# Patient Record
Sex: FEMALE | Race: WHITE | NOT HISPANIC OR LATINO | Employment: OTHER | URBAN - NONMETROPOLITAN AREA
[De-identification: names, ages, dates, MRNs, and addresses within clinical notes are randomized per-mention and may not be internally consistent; named-entity substitution may affect disease eponyms.]

---

## 2023-03-10 DIAGNOSIS — M51.36 LUMBAR DEGENERATIVE DISC DISEASE: ICD-10-CM

## 2023-03-10 RX ORDER — GABAPENTIN 300 MG/1
300 CAPSULE ORAL DAILY
COMMUNITY
Start: 2016-03-14 | End: 2023-03-10 | Stop reason: SDUPTHER

## 2023-03-10 RX ORDER — GABAPENTIN 300 MG/1
300 CAPSULE ORAL DAILY
Qty: 90 CAPSULE | Refills: 0 | Status: SHIPPED | OUTPATIENT
Start: 2023-03-10 | End: 2023-05-30 | Stop reason: SDUPTHER

## 2023-05-19 DIAGNOSIS — E03.9 HYPOTHYROIDISM, UNSPECIFIED TYPE: ICD-10-CM

## 2023-05-19 RX ORDER — LEVOTHYROXINE SODIUM 50 UG/1
1 TABLET ORAL DAILY
COMMUNITY
Start: 2020-11-16 | End: 2023-05-19 | Stop reason: SDUPTHER

## 2023-05-19 RX ORDER — LEVOTHYROXINE SODIUM 50 UG/1
50 TABLET ORAL DAILY
Qty: 90 TABLET | Refills: 0 | Status: SHIPPED | OUTPATIENT
Start: 2023-05-19 | End: 2023-08-18 | Stop reason: SDUPTHER

## 2023-05-30 DIAGNOSIS — G25.81 RESTLESS LEG SYNDROME: ICD-10-CM

## 2023-05-30 DIAGNOSIS — M51.36 LUMBAR DEGENERATIVE DISC DISEASE: ICD-10-CM

## 2023-05-30 RX ORDER — GABAPENTIN 300 MG/1
300 CAPSULE ORAL DAILY
Qty: 90 CAPSULE | Refills: 0 | Status: SHIPPED | OUTPATIENT
Start: 2023-05-30 | End: 2023-08-28 | Stop reason: SDUPTHER

## 2023-05-30 RX ORDER — PRAMIPEXOLE DIHYDROCHLORIDE 1.5 MG/1
0.75 TABLET ORAL NIGHTLY
Qty: 45 TABLET | Refills: 0 | Status: SHIPPED | OUTPATIENT
Start: 2023-05-30 | End: 2023-08-28 | Stop reason: SDUPTHER

## 2023-06-06 LAB
ALANINE AMINOTRANSFERASE (SGPT) (U/L) IN SER/PLAS: 23 U/L (ref 7–45)
ALBUMIN (G/DL) IN SER/PLAS: 4.2 G/DL (ref 3.4–5)
ALKALINE PHOSPHATASE (U/L) IN SER/PLAS: 84 U/L (ref 33–136)
ANION GAP IN SER/PLAS: 14 MMOL/L (ref 10–20)
ASPARTATE AMINOTRANSFERASE (SGOT) (U/L) IN SER/PLAS: 21 U/L (ref 9–39)
BILIRUBIN TOTAL (MG/DL) IN SER/PLAS: 0.3 MG/DL (ref 0–1.2)
CALCIUM (MG/DL) IN SER/PLAS: 9.3 MG/DL (ref 8.6–10.3)
CARBON DIOXIDE, TOTAL (MMOL/L) IN SER/PLAS: 25 MMOL/L (ref 21–32)
CHLORIDE (MMOL/L) IN SER/PLAS: 106 MMOL/L (ref 98–107)
CREATININE (MG/DL) IN SER/PLAS: 1.02 MG/DL (ref 0.5–1.05)
ERYTHROCYTE DISTRIBUTION WIDTH (RATIO) BY AUTOMATED COUNT: 14.5 % (ref 11.5–14.5)
ERYTHROCYTE MEAN CORPUSCULAR HEMOGLOBIN CONCENTRATION (G/DL) BY AUTOMATED: 31.3 G/DL (ref 32–36)
ERYTHROCYTE MEAN CORPUSCULAR VOLUME (FL) BY AUTOMATED COUNT: 96 FL (ref 80–100)
ERYTHROCYTES (10*6/UL) IN BLOOD BY AUTOMATED COUNT: 4.19 X10E12/L (ref 4–5.2)
GFR FEMALE: 58 ML/MIN/1.73M2
GLUCOSE (MG/DL) IN SER/PLAS: 144 MG/DL (ref 74–99)
HEMATOCRIT (%) IN BLOOD BY AUTOMATED COUNT: 40.3 % (ref 36–46)
HEMOGLOBIN (G/DL) IN BLOOD: 12.6 G/DL (ref 12–16)
LEUKOCYTES (10*3/UL) IN BLOOD BY AUTOMATED COUNT: 7.3 X10E9/L (ref 4.4–11.3)
PLATELETS (10*3/UL) IN BLOOD AUTOMATED COUNT: 246 X10E9/L (ref 150–450)
POTASSIUM (MMOL/L) IN SER/PLAS: 3.5 MMOL/L (ref 3.5–5.3)
PROTEIN TOTAL: 7.1 G/DL (ref 6.4–8.2)
PROTEIN TOTAL: 7.1 G/DL (ref 6.4–8.2)
SEDIMENTATION RATE, ERYTHROCYTE: 23 MM/H (ref 0–30)
SODIUM (MMOL/L) IN SER/PLAS: 141 MMOL/L (ref 136–145)
THYROTROPIN (MIU/L) IN SER/PLAS BY DETECTION LIMIT <= 0.05 MIU/L: 3.87 MIU/L (ref 0.44–3.98)
UREA NITROGEN (MG/DL) IN SER/PLAS: 30 MG/DL (ref 6–23)

## 2023-06-07 LAB
ANA PATTERN: ABNORMAL
ANA TITER: ABNORMAL
ANTI-NUCLEAR ANTIBODY (ANA): POSITIVE
COBALAMIN (VITAMIN B12) (PG/ML) IN SER/PLAS: 575 PG/ML (ref 211–911)
ESTIMATED AVERAGE GLUCOSE FOR HBA1C: 123 MG/DL
FOLATE (NG/ML) IN SER/PLAS: >24 NG/ML
HEMOGLOBIN A1C/HEMOGLOBIN TOTAL IN BLOOD: 5.9 %
LEAD (UG/DL) IN BLOOD: <0.5 UG/DL (ref 0–4.9)
SYPHILIS TOTAL AB: NONREACTIVE

## 2023-06-10 LAB
ARSENIC: <10 UG/L
LEAD, BLOOD (ARUP): <2 UG/DL
MERCURY BLOOD: <2.5 UG/L

## 2023-06-13 PROBLEM — G25.81 RESTLESS LEGS SYNDROME: Status: ACTIVE | Noted: 2023-06-13

## 2023-06-13 PROBLEM — I87.2 CHRONIC VENOUS STASIS DERMATITIS: Status: ACTIVE | Noted: 2023-06-13

## 2023-06-13 PROBLEM — R91.8 GROUND GLASS OPACITY PRESENT ON IMAGING OF LUNG: Status: ACTIVE | Noted: 2023-06-13

## 2023-06-13 PROBLEM — M70.71 BURSITIS OF RIGHT HIP: Status: ACTIVE | Noted: 2023-06-13

## 2023-06-13 PROBLEM — G47.30 APNEA, SLEEP: Status: ACTIVE | Noted: 2023-06-13

## 2023-06-13 PROBLEM — M25.559 ARTHRALGIA OF PELVIC REGION AND THIGH: Status: ACTIVE | Noted: 2023-06-13

## 2023-06-13 PROBLEM — G62.9 NEUROPATHY: Status: ACTIVE | Noted: 2023-06-13

## 2023-06-13 PROBLEM — Z98.1 S/P LAMINECTOMY WITH SPINAL FUSION: Status: ACTIVE | Noted: 2023-06-13

## 2023-06-13 PROBLEM — E66.01 MORBID OBESITY (MULTI): Status: ACTIVE | Noted: 2023-06-13

## 2023-06-13 PROBLEM — E78.5 DYSLIPIDEMIA: Status: ACTIVE | Noted: 2023-06-13

## 2023-06-13 PROBLEM — R09.89 BRUIT OF LEFT CAROTID ARTERY: Status: ACTIVE | Noted: 2023-06-13

## 2023-06-13 PROBLEM — M16.9 OSTEOARTHRITIS OF HIP: Status: ACTIVE | Noted: 2023-06-13

## 2023-06-13 PROBLEM — M54.30 SCIATICA: Status: ACTIVE | Noted: 2023-06-13

## 2023-06-13 PROBLEM — J01.10 ACUTE NON-RECURRENT FRONTAL SINUSITIS: Status: ACTIVE | Noted: 2023-06-13

## 2023-06-13 PROBLEM — M51.36 LUMBAR DEGENERATIVE DISC DISEASE: Status: ACTIVE | Noted: 2023-06-13

## 2023-06-13 PROBLEM — Z96.649 S/P TOTAL HIP ARTHROPLASTY: Status: ACTIVE | Noted: 2023-06-13

## 2023-06-13 PROBLEM — I89.0 LYMPHEDEMA: Status: ACTIVE | Noted: 2023-06-13

## 2023-06-13 PROBLEM — M54.16 CHRONIC LUMBAR RADICULOPATHY: Status: ACTIVE | Noted: 2023-06-13

## 2023-06-13 PROBLEM — R74.8 ABNORMAL LIVER ENZYMES: Status: ACTIVE | Noted: 2023-06-13

## 2023-06-13 PROBLEM — E03.9 HYPOTHYROID: Status: ACTIVE | Noted: 2023-06-13

## 2023-06-13 PROBLEM — M48.07 SPINAL STENOSIS OF LUMBOSACRAL REGION: Status: ACTIVE | Noted: 2023-06-13

## 2023-06-13 PROBLEM — M51.369 LUMBAR DEGENERATIVE DISC DISEASE: Status: ACTIVE | Noted: 2023-06-13

## 2023-06-13 PROBLEM — M54.50 LOW BACK PAIN: Status: ACTIVE | Noted: 2023-06-13

## 2023-06-13 PROBLEM — M54.12 CERVICAL RADICULITIS: Status: ACTIVE | Noted: 2023-06-13

## 2023-06-13 PROBLEM — E78.00 HYPERCHOLESTEROLEMIA: Status: ACTIVE | Noted: 2023-06-13

## 2023-06-13 PROBLEM — C50.919 BREAST CANCER (MULTI): Status: ACTIVE | Noted: 2023-06-13

## 2023-06-13 PROBLEM — E78.1 HYPERTRIGLYCERIDEMIA: Status: ACTIVE | Noted: 2023-06-13

## 2023-06-13 PROBLEM — I10 BENIGN ESSENTIAL HYPERTENSION: Status: ACTIVE | Noted: 2023-06-13

## 2023-06-13 PROBLEM — E55.9 VITAMIN D DEFICIENCY: Status: ACTIVE | Noted: 2023-06-13

## 2023-06-13 PROBLEM — I87.2 VENOUS INSUFFICIENCY OF BOTH LOWER EXTREMITIES: Status: ACTIVE | Noted: 2023-06-13

## 2023-06-13 PROBLEM — I10 HYPERTENSION: Status: ACTIVE | Noted: 2023-06-13

## 2023-06-13 PROBLEM — I65.23 ATHEROSCLEROSIS OF BOTH CAROTID ARTERIES: Status: ACTIVE | Noted: 2023-06-13

## 2023-06-13 LAB
ALBUMIN ELP: 4.1 G/DL (ref 3.4–5)
ALPHA 1: 0.3 G/DL (ref 0.2–0.6)
ALPHA 2: 0.9 G/DL (ref 0.4–1.1)
BETA: 0.8 G/DL (ref 0.5–1.2)
GAMMA GLOBULIN: 1 G/DL (ref 0.5–1.4)
M-PROTEIN 1: 0.2 G/DL
PATH REVIEW - SERUM IMMUNOFIXATION: NORMAL
PATH REVIEW-SERUM PROTEIN ELECTROPHORESIS: NORMAL
PROTEIN ELECTROPHORESIS INTERPRETATION: ABNORMAL
PROTEIN TOTAL: 7.1 G/DL (ref 6.4–8.2)
SERUM IMMUNOFIXATION INTERPRETATION: ABNORMAL

## 2023-06-13 RX ORDER — ASPIRIN 81 MG/1
1 TABLET ORAL DAILY
COMMUNITY
Start: 2017-02-23 | End: 2023-07-11 | Stop reason: ALTCHOICE

## 2023-06-13 RX ORDER — LATANOPROST 50 UG/ML
SOLUTION/ DROPS OPHTHALMIC
COMMUNITY
Start: 2013-07-26

## 2023-06-13 RX ORDER — TOPIRAMATE 50 MG/1
50 TABLET, FILM COATED ORAL 2 TIMES DAILY
COMMUNITY
End: 2023-08-22 | Stop reason: SDUPTHER

## 2023-06-13 RX ORDER — BRIMONIDINE TARTRATE 1 MG/ML
SOLUTION/ DROPS OPHTHALMIC
COMMUNITY
Start: 2014-01-20 | End: 2023-07-11 | Stop reason: ALTCHOICE

## 2023-06-13 RX ORDER — BRIMONIDINE TARTRATE 2 MG/ML
SOLUTION/ DROPS OPHTHALMIC
COMMUNITY
Start: 2023-04-11

## 2023-06-13 RX ORDER — OMEGA-3 FATTY ACIDS 1000 MG
CAPSULE ORAL 2 TIMES DAILY
COMMUNITY
End: 2023-09-25 | Stop reason: SINTOL

## 2023-06-13 RX ORDER — LANOLIN ALCOHOL/MO/W.PET/CERES
1 CREAM (GRAM) TOPICAL DAILY
COMMUNITY

## 2023-06-13 RX ORDER — CARVEDILOL 25 MG/1
25 TABLET ORAL 2 TIMES DAILY
COMMUNITY
Start: 2020-05-04 | End: 2023-08-10 | Stop reason: SDUPTHER

## 2023-06-13 RX ORDER — ACETAMINOPHEN 325 MG/1
325 TABLET ORAL EVERY 6 HOURS PRN
COMMUNITY
Start: 2021-06-07

## 2023-06-13 RX ORDER — ROSUVASTATIN CALCIUM 10 MG/1
1 TABLET, COATED ORAL DAILY
COMMUNITY
Start: 2018-12-11 | End: 2023-07-11 | Stop reason: SDUPTHER

## 2023-06-13 RX ORDER — DORZOLAMIDE HYDROCHLORIDE AND TIMOLOL MALEATE 20; 5 MG/ML; MG/ML
SOLUTION/ DROPS OPHTHALMIC 2 TIMES DAILY
COMMUNITY
Start: 2013-09-16

## 2023-06-13 RX ORDER — CHOLECALCIFEROL (VITAMIN D3) 125 MCG
125 CAPSULE ORAL DAILY
COMMUNITY
Start: 2015-06-18 | End: 2023-11-14 | Stop reason: SDUPTHER

## 2023-06-13 RX ORDER — AZILSARTAN KAMEDOXOMIL AND CHLORTHALIDONE 40; 25 MG/1; MG/1
1 TABLET ORAL DAILY
COMMUNITY
Start: 2020-07-16 | End: 2024-03-28 | Stop reason: SDUPTHER

## 2023-07-11 ENCOUNTER — OFFICE VISIT (OUTPATIENT)
Dept: PRIMARY CARE | Facility: CLINIC | Age: 73
End: 2023-07-11
Payer: MEDICARE

## 2023-07-11 ENCOUNTER — LAB (OUTPATIENT)
Dept: LAB | Facility: LAB | Age: 73
End: 2023-07-11
Payer: MEDICARE

## 2023-07-11 VITALS
DIASTOLIC BLOOD PRESSURE: 60 MMHG | BODY MASS INDEX: 39.22 KG/M2 | OXYGEN SATURATION: 91 % | SYSTOLIC BLOOD PRESSURE: 136 MMHG | HEART RATE: 70 BPM | TEMPERATURE: 97.9 F | WEIGHT: 221.4 LBS

## 2023-07-11 DIAGNOSIS — L97.511 NON-PRESSURE CHRONIC ULCER OF OTHER PART OF RIGHT FOOT LIMITED TO BREAKDOWN OF SKIN (MULTI): Primary | ICD-10-CM

## 2023-07-11 DIAGNOSIS — R05.9 COUGH, UNSPECIFIED TYPE: ICD-10-CM

## 2023-07-11 DIAGNOSIS — E78.00 HYPERCHOLESTEROLEMIA: ICD-10-CM

## 2023-07-11 DIAGNOSIS — R77.8 ABNORMAL SERUM PROTEIN ELECTROPHORESIS: ICD-10-CM

## 2023-07-11 DIAGNOSIS — E66.01 MORBID OBESITY (MULTI): ICD-10-CM

## 2023-07-11 DIAGNOSIS — E78.5 DYSLIPIDEMIA: ICD-10-CM

## 2023-07-11 DIAGNOSIS — G47.30 SLEEP APNEA, UNSPECIFIED TYPE: ICD-10-CM

## 2023-07-11 DIAGNOSIS — I10 BENIGN ESSENTIAL HYPERTENSION: ICD-10-CM

## 2023-07-11 DIAGNOSIS — G56.03 BILATERAL CARPAL TUNNEL SYNDROME: ICD-10-CM

## 2023-07-11 DIAGNOSIS — C50.912 MALIGNANT NEOPLASM OF LEFT FEMALE BREAST, UNSPECIFIED ESTROGEN RECEPTOR STATUS, UNSPECIFIED SITE OF BREAST (MULTI): ICD-10-CM

## 2023-07-11 DIAGNOSIS — R73.03 PREDIABETES: ICD-10-CM

## 2023-07-11 PROCEDURE — 84165 PROTEIN E-PHORESIS SERUM: CPT | Performed by: PATHOLOGY

## 2023-07-11 PROCEDURE — 86334 IMMUNOFIX E-PHORESIS SERUM: CPT

## 2023-07-11 PROCEDURE — 1157F ADVNC CARE PLAN IN RCRD: CPT | Performed by: INTERNAL MEDICINE

## 2023-07-11 PROCEDURE — 1160F RVW MEDS BY RX/DR IN RCRD: CPT | Performed by: INTERNAL MEDICINE

## 2023-07-11 PROCEDURE — 3075F SYST BP GE 130 - 139MM HG: CPT | Performed by: INTERNAL MEDICINE

## 2023-07-11 PROCEDURE — 36415 COLL VENOUS BLD VENIPUNCTURE: CPT

## 2023-07-11 PROCEDURE — 84165 PROTEIN E-PHORESIS SERUM: CPT

## 2023-07-11 PROCEDURE — 3078F DIAST BP <80 MM HG: CPT | Performed by: INTERNAL MEDICINE

## 2023-07-11 PROCEDURE — 1036F TOBACCO NON-USER: CPT | Performed by: INTERNAL MEDICINE

## 2023-07-11 PROCEDURE — 1159F MED LIST DOCD IN RCRD: CPT | Performed by: INTERNAL MEDICINE

## 2023-07-11 PROCEDURE — 99215 OFFICE O/P EST HI 40 MIN: CPT | Performed by: INTERNAL MEDICINE

## 2023-07-11 PROCEDURE — 86320 SERUM IMMUNOELECTROPHORESIS: CPT | Performed by: PATHOLOGY

## 2023-07-11 RX ORDER — METFORMIN HYDROCHLORIDE 500 MG/1
500 TABLET ORAL
Qty: 60 TABLET | Refills: 11 | Status: SHIPPED | OUTPATIENT
Start: 2023-07-11 | End: 2023-09-25 | Stop reason: SINTOL

## 2023-07-11 RX ORDER — ROSUVASTATIN CALCIUM 10 MG/1
10 TABLET, COATED ORAL DAILY
Qty: 90 TABLET | Refills: 0 | Status: SHIPPED | OUTPATIENT
Start: 2023-07-11 | End: 2023-09-28

## 2023-07-11 ASSESSMENT — ENCOUNTER SYMPTOMS
PALPITATIONS: 0
FEVER: 0
TREMORS: 1
COUGH: 1
ARTHRALGIAS: 0
DIARRHEA: 0
SORE THROAT: 0
SINUS PAIN: 0
FATIGUE: 1
ABDOMINAL PAIN: 0
WHEEZING: 0
DIZZINESS: 0
UNEXPECTED WEIGHT CHANGE: 1
BRUISES/BLEEDS EASILY: 0
BLOOD IN STOOL: 0
HEADACHES: 0
DIFFICULTY URINATING: 0
WEAKNESS: 1
HYPERTENSION: 1

## 2023-07-11 NOTE — PROGRESS NOTES
"Subjective   Patient ID: Ashli Justice is a 72 y.o. female who presents for Hyperlipidemia, Hypertension, Hypothyroidism, Cough (Tickle in throat and cough since starting Topiramate), Peripheral Neuropathy (Seems to be getting worse, but does not want to see anyone out of town for it), Immunizations (Discuss pneumonia ), and Med Refill.    - Patient seen by podiatry work-up showed-prediabetes hemoglobin A1c 5.9 counseled about diet controlled low-carb diet start metformin 500 mg twice a day patient agreed follow-up results closely in 4 weeks  - Abnormal blood work high immunoglobulin on the serum protein electrophoresis very trivial elevation need to repeat this test again for further recommendation order placed today  -Bilateral hand carpal tunnel syndrome and weakness occasionally dropping objects from her hand  Patient counseled about the need to follow-up hand surgery patient declined aware of risk and benefit  - Underlying sleep apnea continue with fatigue and tiredness and weight gain declined CPAP declined evaluation by sleep clinic  Morbid obesity  - Coughing it is unclear obtain chest x-ray follow-up results closely  -\" Obesity patient now BMI 39 lost about 10 pounds since last time continue topiramate 50 mg twice a day continue on 1000-calorie reduction  -Neuropathy continues gabapentin 300 mg in p.m.  -Hypertension, improving, counseled about weight loss  -Hypothyroid controlled  -Lymphedema continue with continuous wrapping per lymphedema clinic  -Lumbosacral disease cut down gabapentin take it only at bedtime  -Carotid atherosclerosis ultrasound not changed less than 50% no TIA continue on aspirin marie  -Breast cancer in remission awaiting follow-up new hematology oncology for monitoring  Follow-up 4 weeks      Hyperlipidemia  Pertinent negatives include no chest pain.   Hypertension  Pertinent negatives include no chest pain, headaches or palpitations.   Cough  Pertinent negatives include no chest pain, " ear pain, fever, headaches, rash, sore throat or wheezing.   Neuropathy      Med Refill  Associated symptoms include coughing, fatigue and weakness. Pertinent negatives include no abdominal pain, arthralgias, chest pain, congestion, fever, headaches, rash or sore throat.          Review of Systems   Constitutional:  Positive for fatigue and unexpected weight change. Negative for fever.   HENT:  Negative for congestion, ear discharge, ear pain, mouth sores, sinus pain and sore throat.    Eyes:  Negative for visual disturbance.   Respiratory:  Positive for cough. Negative for wheezing.    Cardiovascular:  Negative for chest pain, palpitations and leg swelling.   Gastrointestinal:  Negative for abdominal pain, blood in stool and diarrhea.   Genitourinary:  Negative for difficulty urinating.   Musculoskeletal:  Negative for arthralgias.   Skin:  Negative for rash.   Neurological:  Positive for tremors and weakness. Negative for dizziness and headaches.   Hematological:  Does not bruise/bleed easily.   Psychiatric/Behavioral:  Negative for behavioral problems.    All other systems reviewed and are negative.      Objective   Lab Results   Component Value Date    HGBA1C 5.9 (A) 06/06/2023      /60   Pulse 70   Temp 36.6 °C (97.9 °F)   Wt 100 kg (221 lb 6.4 oz)   SpO2 91%   BMI 39.22 kg/m²     Physical Exam  Vitals and nursing note reviewed.   Constitutional:       Appearance: Normal appearance. She is obese.   HENT:      Head: Normocephalic.      Nose: Nose normal.   Eyes:      Conjunctiva/sclera: Conjunctivae normal.      Pupils: Pupils are equal, round, and reactive to light.   Cardiovascular:      Rate and Rhythm: Regular rhythm.   Pulmonary:      Effort: Pulmonary effort is normal.      Breath sounds: Normal breath sounds.   Abdominal:      General: Abdomen is flat.      Palpations: Abdomen is soft.   Musculoskeletal:      Cervical back: Neck supple.      Right lower leg: Edema present.      Left lower leg:  Edema present.   Skin:     General: Skin is warm.   Neurological:      General: No focal deficit present.      Mental Status: She is oriented to person, place, and time.      Sensory: Sensory deficit (Peripheral neuropathy) present.      Motor: Weakness (Bilateral weak handgrip and bilateral neuropathy) present.   Psychiatric:         Mood and Affect: Mood normal.         Assessment/Plan   Ashli was seen today for hyperlipidemia, hypertension, hypothyroidism, cough, peripheral neuropathy, immunizations and med refill.  Diagnoses and all orders for this visit:  Non-pressure chronic ulcer of other part of right foot limited to breakdown of skin (CMS/HCC) (Primary)  Hypercholesterolemia  -     rosuvastatin (Crestor) 10 mg tablet; Take 1 tablet (10 mg) by mouth once daily.  Morbid obesity (CMS/HCC)  Benign essential hypertension  Bilateral carpal tunnel syndrome  Sleep apnea, unspecified type  Dyslipidemia  Prediabetes  -     metFORMIN (Glucophage) 500 mg tablet; Take 1 tablet (500 mg) by mouth 2 times a day with meals.  Abnormal serum protein electrophoresis  -     Serum Protein Electrophoresis + Immunofixation; Future  Cough, unspecified type  -     XR chest 2 views; Future  Malignant neoplasm of left female breast, unspecified estrogen receptor status, unspecified site of breast (CMS/HCC)  Other orders  -     Follow Up In Primary Care - Established; Future   - Patient seen by podiatry work-up showed-prediabetes hemoglobin A1c 5.9 counseled about diet controlled low-carb diet start metformin 500 mg twice a day patient agreed follow-up results closely in 4 weeks  - Abnormal blood work high immunoglobulin on the serum protein electrophoresis very trivial elevation need to repeat this test again for further recommendation order placed today  -Bilateral hand carpal tunnel syndrome and weakness occasionally dropping objects from her hand  Patient counseled about the need to follow-up hand surgery patient declined aware of  "risk and benefit  - Underlying sleep apnea continue with fatigue and tiredness and weight gain declined CPAP declined evaluation by sleep clinic  Morbid obesity  - Coughing it is unclear obtain chest x-ray follow-up results closely  -\" Obesity patient now BMI 39 lost about 10 pounds since last time continue topiramate 50 mg twice a day continue on 1000-calorie reduction  -Neuropathy continues gabapentin 300 mg in p.m.  -Hypertension, improving, counseled about weight loss  -Hypothyroid controlled  -Lymphedema continue with continuous wrapping per lymphedema clinic  -Lumbosacral disease cut down gabapentin take it only at bedtime  -Carotid atherosclerosis ultrasound not changed less than 50% no TIA continue on aspirin marie  -Breast cancer in remission awaiting follow-up new hematology oncology for monitoring  Follow-up 4 weeks  - Patient seen by podiatry work-up showed-prediabetes hemoglobin A1c 5.9 counseled about diet controlled low-carb diet start metformin 500 mg twice a day patient agreed follow-up results closely in 4 weeks  - Abnormal blood work high immunoglobulin on the serum protein electrophoresis very trivial elevation need to repeat this test again for further recommendation order placed today  -Bilateral hand carpal tunnel syndrome and weakness occasionally dropping objects from her hand  Patient counseled about the need to follow-up hand surgery patient declined aware of risk and benefit  - Underlying sleep apnea continue with fatigue and tiredness and weight gain declined CPAP declined evaluation by sleep clinic  Morbid obesity  - Coughing it is unclear obtain chest x-ray follow-up results closely  -\" Obesity patient now BMI 39 lost about 10 pounds since last time continue topiramate 50 mg twice a day continue on 1000-calorie reduction  -Neuropathy continues gabapentin 300 mg in p.m.  -Hypertension, improving, counseled about weight loss  -Hypothyroid controlled  -Lymphedema continue with continuous " wrapping per lymphedema clinic  -Lumbosacral disease cut down gabapentin take it only at bedtime  -Carotid atherosclerosis ultrasound not changed less than 50% no TIA continue on aspirin marie  -Breast cancer in remission awaiting follow-up new hematology oncology for monitoring  Follow-up 4 weeks

## 2023-07-14 LAB
ALBUMIN ELP: 4.2 G/DL (ref 3.4–5)
ALPHA 1: 0.3 G/DL (ref 0.2–0.6)
ALPHA 2: 0.9 G/DL (ref 0.4–1.1)
BETA: 0.9 G/DL (ref 0.5–1.2)
GAMMA GLOBULIN: 1 G/DL (ref 0.5–1.4)
M-PROTEIN 1: 0.2 G/DL
PATH REVIEW - SERUM IMMUNOFIXATION: NORMAL
PATH REVIEW-SERUM PROTEIN ELECTROPHORESIS: NORMAL
PROTEIN ELECTROPHORESIS INTERPRETATION: ABNORMAL
PROTEIN TOTAL: 7.3 G/DL (ref 6.4–8.2)
SERUM IMMUNOFIXATION INTERPRETATION: ABNORMAL

## 2023-07-19 DIAGNOSIS — C90.00 MULTIPLE MYELOMA, REMISSION STATUS UNSPECIFIED (MULTI): Primary | ICD-10-CM

## 2023-07-20 ENCOUNTER — TELEPHONE (OUTPATIENT)
Dept: PRIMARY CARE | Facility: CLINIC | Age: 73
End: 2023-07-20
Payer: MEDICARE

## 2023-07-20 NOTE — TELEPHONE ENCOUNTER
----- Message from Sagrario Rachel MD sent at 7/19/2023 10:26 PM EDT -----  Abnormal serum protein electrophoresis  Needs referral to hematology oncology  Patient will be referred to Dr. Kerr order placed in the EMR

## 2023-07-24 DIAGNOSIS — C90.00 MULTIPLE MYELOMA, REMISSION STATUS UNSPECIFIED (MULTI): ICD-10-CM

## 2023-08-01 DIAGNOSIS — R77.8 ABNORMAL SERUM PROTEIN ELECTROPHORESIS: ICD-10-CM

## 2023-08-10 ENCOUNTER — OFFICE VISIT (OUTPATIENT)
Dept: PRIMARY CARE | Facility: CLINIC | Age: 73
End: 2023-08-10
Payer: MEDICARE

## 2023-08-10 VITALS
WEIGHT: 221.4 LBS | SYSTOLIC BLOOD PRESSURE: 142 MMHG | BODY MASS INDEX: 39.22 KG/M2 | TEMPERATURE: 98.7 F | HEART RATE: 72 BPM | OXYGEN SATURATION: 94 % | DIASTOLIC BLOOD PRESSURE: 62 MMHG

## 2023-08-10 DIAGNOSIS — I10 BENIGN ESSENTIAL HYPERTENSION: ICD-10-CM

## 2023-08-10 DIAGNOSIS — G56.03 BILATERAL CARPAL TUNNEL SYNDROME: ICD-10-CM

## 2023-08-10 DIAGNOSIS — E78.5 DYSLIPIDEMIA: Primary | ICD-10-CM

## 2023-08-10 DIAGNOSIS — R77.8 ABNORMAL SERUM PROTEIN ELECTROPHORESIS: ICD-10-CM

## 2023-08-10 DIAGNOSIS — E78.00 HYPERCHOLESTEROLEMIA: ICD-10-CM

## 2023-08-10 PROCEDURE — 99214 OFFICE O/P EST MOD 30 MIN: CPT | Performed by: INTERNAL MEDICINE

## 2023-08-10 PROCEDURE — 1036F TOBACCO NON-USER: CPT | Performed by: INTERNAL MEDICINE

## 2023-08-10 PROCEDURE — 1160F RVW MEDS BY RX/DR IN RCRD: CPT | Performed by: INTERNAL MEDICINE

## 2023-08-10 PROCEDURE — 3077F SYST BP >= 140 MM HG: CPT | Performed by: INTERNAL MEDICINE

## 2023-08-10 PROCEDURE — 3078F DIAST BP <80 MM HG: CPT | Performed by: INTERNAL MEDICINE

## 2023-08-10 PROCEDURE — 1157F ADVNC CARE PLAN IN RCRD: CPT | Performed by: INTERNAL MEDICINE

## 2023-08-10 PROCEDURE — 1159F MED LIST DOCD IN RCRD: CPT | Performed by: INTERNAL MEDICINE

## 2023-08-10 RX ORDER — CARVEDILOL 25 MG/1
25 TABLET ORAL DAILY
Qty: 90 TABLET | Refills: 1 | Status: SHIPPED | OUTPATIENT
Start: 2023-08-10 | End: 2023-10-25

## 2023-08-10 ASSESSMENT — ENCOUNTER SYMPTOMS
PALPITATIONS: 0
COUGH: 0
SINUS PAIN: 0
DIFFICULTY URINATING: 0
UNEXPECTED WEIGHT CHANGE: 0
BLOOD IN STOOL: 0
BRUISES/BLEEDS EASILY: 0
FEVER: 0
DIZZINESS: 0
DIARRHEA: 0
SORE THROAT: 0
FATIGUE: 0
HEADACHES: 0
ARTHRALGIAS: 0
WHEEZING: 0
ABDOMINAL PAIN: 0

## 2023-08-10 ASSESSMENT — PATIENT HEALTH QUESTIONNAIRE - PHQ9
2. FEELING DOWN, DEPRESSED OR HOPELESS: NOT AT ALL
SUM OF ALL RESPONSES TO PHQ9 QUESTIONS 1 AND 2: 0
1. LITTLE INTEREST OR PLEASURE IN DOING THINGS: NOT AT ALL

## 2023-08-10 NOTE — PROGRESS NOTES
"Subjective   Patient ID: Ashli Justice is a 72 y.o. female who presents for Follow-up (1 mth follow up) and Results (Would like to know test resutls).    --Abnormal serum protein electrophoresis patient may have underlying monoclonal gammopathy patient scheduled appointment with Dr. Caruso with rheumatology also awaiting her follow-up with hematology oncology Dr. Parada  On the 24th of this months follow-up results closely  - Bilateral carpal tunnel syndrome treated conservatively declined surgical intervention at this time  - Underlying sleep apnea continue with fatigue and tiredness and weight gain declined CPAP declined evaluation by sleep clinic  Morbid obesity  - Coughing it is unclear obtain chest x-ray follow-up results closely  -\" Obesity patient now BMI 39 lost about 10 pounds since last time continue topiramate 50 mg twice a day continue on 1000-calorie reduction  -Neuropathy continues gabapentin 300 mg in p.m.  -Hypertension, improving, counseled about weight loss  -Hypothyroid controlled  -Lymphedema continue with continuous wrapping per lymphedema clinic  -Lumbosacral disease cut down gabapentin take it only at bedtime  -Carotid atherosclerosis ultrasound not changed less than 50% no TIA continue on aspirin marie  -Breast cancer in remission awaiting follow-up hematology oncology for monitoring  Follow-up 3 months             Review of Systems   Constitutional:  Negative for fatigue, fever and unexpected weight change.   HENT:  Negative for congestion, ear discharge, ear pain, mouth sores, sinus pain and sore throat.    Eyes:  Negative for visual disturbance.   Respiratory:  Negative for cough and wheezing.    Cardiovascular:  Negative for chest pain, palpitations and leg swelling.   Gastrointestinal:  Negative for abdominal pain, blood in stool and diarrhea.   Genitourinary:  Negative for difficulty urinating.   Musculoskeletal:  Negative for arthralgias.   Skin:  Negative for rash.   Neurological:  " Negative for dizziness and headaches.   Hematological:  Does not bruise/bleed easily.   Psychiatric/Behavioral:  Negative for behavioral problems.    All other systems reviewed and are negative.      Objective   Lab Results   Component Value Date    HGBA1C 5.9 (A) 06/06/2023      /62   Pulse 72   Temp 37.1 °C (98.7 °F)   Wt 100 kg (221 lb 6.4 oz)   SpO2 94%   BMI 39.22 kg/m²     Physical Exam  Vitals and nursing note reviewed.   Constitutional:       Appearance: Normal appearance.   HENT:      Head: Normocephalic.      Nose: Nose normal.   Eyes:      Conjunctiva/sclera: Conjunctivae normal.      Pupils: Pupils are equal, round, and reactive to light.   Cardiovascular:      Rate and Rhythm: Regular rhythm.   Pulmonary:      Effort: Pulmonary effort is normal.      Breath sounds: Normal breath sounds.   Abdominal:      General: Abdomen is flat.      Palpations: Abdomen is soft.   Musculoskeletal:      Cervical back: Neck supple.   Skin:     General: Skin is warm.   Neurological:      General: No focal deficit present.      Mental Status: She is oriented to person, place, and time.   Psychiatric:         Mood and Affect: Mood normal.         Assessment/Plan   Ashli was seen today for follow-up and results.  Diagnoses and all orders for this visit:  Dyslipidemia (Primary)  Benign essential hypertension  -     carvedilol (Coreg) 25 mg tablet; Take 1 tablet (25 mg) by mouth once daily.  Abnormal serum protein electrophoresis  Bilateral carpal tunnel syndrome  Hypercholesterolemia  Other orders  -     Follow Up In Primary Care - Established   --Abnormal serum protein electrophoresis patient may have underlying monoclonal gammopathy patient scheduled appointment with Dr. Caruso with rheumatology also awaiting her follow-up with hematology oncology Dr. Parada  On the 24th of this months follow-up results closely  - Bilateral carpal tunnel syndrome treated conservatively declined surgical intervention at this  "time  - Underlying sleep apnea continue with fatigue and tiredness and weight gain declined CPAP declined evaluation by sleep clinic  Morbid obesity  - Coughing it is unclear obtain chest x-ray follow-up results closely  -\" Obesity patient now BMI 39 lost about 10 pounds since last time continue topiramate 50 mg twice a day continue on 1000-calorie reduction  -Neuropathy continues gabapentin 300 mg in p.m.  -Hypertension, improving, counseled about weight loss  -Hypothyroid controlled  -Lymphedema continue with continuous wrapping per lymphedema clinic  -Lumbosacral disease cut down gabapentin take it only at bedtime  -Carotid atherosclerosis ultrasound not changed less than 50% no TIA continue on aspirin marie  -Breast cancer in remission awaiting follow-up hematology oncology for monitoring  Follow-up 3 months    "

## 2023-08-17 DIAGNOSIS — E03.9 HYPOTHYROIDISM, UNSPECIFIED TYPE: ICD-10-CM

## 2023-08-18 RX ORDER — LEVOTHYROXINE SODIUM 50 UG/1
50 TABLET ORAL DAILY
Qty: 90 TABLET | Refills: 0 | Status: SHIPPED | OUTPATIENT
Start: 2023-08-18 | End: 2023-11-10 | Stop reason: SDUPTHER

## 2023-08-22 DIAGNOSIS — G62.9 NEUROPATHY: ICD-10-CM

## 2023-08-22 LAB
ALANINE AMINOTRANSFERASE (SGPT) (U/L) IN SER/PLAS: 24 U/L (ref 7–45)
ALBUMIN (G/DL) IN SER/PLAS: 4.1 G/DL (ref 3.4–5)
ALKALINE PHOSPHATASE (U/L) IN SER/PLAS: 69 U/L (ref 33–136)
ANION GAP IN SER/PLAS: 10 MMOL/L (ref 10–20)
ASPARTATE AMINOTRANSFERASE (SGOT) (U/L) IN SER/PLAS: 23 U/L (ref 9–39)
BASOPHILS (10*3/UL) IN BLOOD BY AUTOMATED COUNT: 0.06 X10E9/L (ref 0–0.1)
BASOPHILS/100 LEUKOCYTES IN BLOOD BY AUTOMATED COUNT: 0.7 % (ref 0–2)
BILIRUBIN TOTAL (MG/DL) IN SER/PLAS: 0.3 MG/DL (ref 0–1.2)
CALCIUM (MG/DL) IN SER/PLAS: 9 MG/DL (ref 8.6–10.3)
CARBON DIOXIDE, TOTAL (MMOL/L) IN SER/PLAS: 25 MMOL/L (ref 21–32)
CHLORIDE (MMOL/L) IN SER/PLAS: 108 MMOL/L (ref 98–107)
CREATININE (MG/DL) IN SER/PLAS: 1.07 MG/DL (ref 0.5–1.05)
EOSINOPHILS (10*3/UL) IN BLOOD BY AUTOMATED COUNT: 0.09 X10E9/L (ref 0–0.4)
EOSINOPHILS/100 LEUKOCYTES IN BLOOD BY AUTOMATED COUNT: 1.1 % (ref 0–6)
ERYTHROCYTE DISTRIBUTION WIDTH (RATIO) BY AUTOMATED COUNT: 14.4 % (ref 11.5–14.5)
ERYTHROCYTE MEAN CORPUSCULAR HEMOGLOBIN CONCENTRATION (G/DL) BY AUTOMATED: 31.9 G/DL (ref 32–36)
ERYTHROCYTE MEAN CORPUSCULAR VOLUME (FL) BY AUTOMATED COUNT: 96 FL (ref 80–100)
ERYTHROCYTES (10*6/UL) IN BLOOD BY AUTOMATED COUNT: 4.03 X10E12/L (ref 4–5.2)
GFR FEMALE: 54 ML/MIN/1.73M2
GLUCOSE (MG/DL) IN SER/PLAS: 90 MG/DL (ref 74–99)
HEMATOCRIT (%) IN BLOOD BY AUTOMATED COUNT: 38.6 % (ref 36–46)
HEMOGLOBIN (G/DL) IN BLOOD: 12.3 G/DL (ref 12–16)
IMMATURE GRANULOCYTES/100 LEUKOCYTES IN BLOOD BY AUTOMATED COUNT: 0.4 % (ref 0–0.9)
LACTATE DEHYDROGENASE (U/L) IN SER/PLAS BY LAC->PYR RXN: 165 U/L (ref 84–246)
LEUKOCYTES (10*3/UL) IN BLOOD BY AUTOMATED COUNT: 8.2 X10E9/L (ref 4.4–11.3)
LYMPHOCYTES (10*3/UL) IN BLOOD BY AUTOMATED COUNT: 2.04 X10E9/L (ref 0.8–3)
LYMPHOCYTES/100 LEUKOCYTES IN BLOOD BY AUTOMATED COUNT: 24.8 % (ref 13–44)
MONOCYTES (10*3/UL) IN BLOOD BY AUTOMATED COUNT: 0.65 X10E9/L (ref 0.05–0.8)
MONOCYTES/100 LEUKOCYTES IN BLOOD BY AUTOMATED COUNT: 7.9 % (ref 2–10)
NEUTROPHILS (10*3/UL) IN BLOOD BY AUTOMATED COUNT: 5.37 X10E9/L (ref 1.6–5.5)
NEUTROPHILS/100 LEUKOCYTES IN BLOOD BY AUTOMATED COUNT: 65.1 % (ref 40–80)
PLATELETS (10*3/UL) IN BLOOD AUTOMATED COUNT: 221 X10E9/L (ref 150–450)
POTASSIUM (MMOL/L) IN SER/PLAS: 4.3 MMOL/L (ref 3.5–5.3)
PROTEIN TOTAL: 7.1 G/DL (ref 6.4–8.2)
SODIUM (MMOL/L) IN SER/PLAS: 139 MMOL/L (ref 136–145)
UREA NITROGEN (MG/DL) IN SER/PLAS: 29 MG/DL (ref 6–23)

## 2023-08-22 RX ORDER — TOPIRAMATE 50 MG/1
50 TABLET, FILM COATED ORAL 2 TIMES DAILY
Qty: 180 TABLET | Refills: 1 | Status: SHIPPED | OUTPATIENT
Start: 2023-08-22 | End: 2023-11-14 | Stop reason: SDUPTHER

## 2023-08-23 LAB
HEPATITIS A VIRUS IGM AB PRESENCE IN SER/PLAS BY IMMUNOASSAY: NONREACTIVE
HEPATITIS B VIRUS CORE IGM AB PRESENCE IN SER/PLAS BY IMMUNOASSY: NONREACTIVE
HEPATITIS B VIRUS SURFACE AG PRESENCE IN SERUM: NONREACTIVE
HEPATITIS C VIRUS AB PRESENCE IN SERUM: NONREACTIVE
IGA (MG/DL) IN SER/PLAS: 201 MG/DL (ref 70–400)
IGG (MG/DL) IN SER/PLAS: 1060 MG/DL (ref 700–1600)
IGM (MG/DL) IN SER/PLAS: 78 MG/DL (ref 40–230)
IMMUNOGLOBULIN LIGHT CHAINS KAPPA/LAMBDA (MASS RATIO) IN SERUM: 1.58 (ref 0.26–1.65)
IMMUNOGLOBULIN LIGHT CHAINS.KAPPA (MG/DL) IN SERUM: 3.88 MG/DL (ref 0.33–1.94)
IMMUNOGLOBULIN LIGHT CHAINS.LAMBDA (MG/DL) IN SERUM: 2.46 MG/DL (ref 0.57–2.63)

## 2023-08-27 LAB
ALBUMIN ELP: 4.1 G/DL (ref 3.4–5)
ALPHA 1: 0.3 G/DL (ref 0.2–0.6)
ALPHA 2: 0.9 G/DL (ref 0.4–1.1)
BETA: 0.8 G/DL (ref 0.5–1.2)
GAMMA GLOBULIN: 0.9 G/DL (ref 0.5–1.4)
M-PROTEIN 1: 0.1 G/DL
PATH REVIEW - SERUM IMMUNOFIXATION: NORMAL
PATH REVIEW-SERUM PROTEIN ELECTROPHORESIS: NORMAL
PROTEIN ELECTROPHORESIS INTERPRETATION: ABNORMAL
PROTEIN TOTAL: 7.1 G/DL (ref 6.4–8.2)
SERUM IMMUNOFIXATION INTERPRETATION: ABNORMAL

## 2023-08-28 DIAGNOSIS — M51.36 LUMBAR DEGENERATIVE DISC DISEASE: ICD-10-CM

## 2023-08-28 DIAGNOSIS — G25.81 RESTLESS LEG SYNDROME: ICD-10-CM

## 2023-08-28 RX ORDER — PRAMIPEXOLE DIHYDROCHLORIDE 1.5 MG/1
0.75 TABLET ORAL NIGHTLY
Qty: 45 TABLET | Refills: 0 | Status: SHIPPED | OUTPATIENT
Start: 2023-08-28 | End: 2023-11-30 | Stop reason: SDUPTHER

## 2023-08-28 RX ORDER — GABAPENTIN 300 MG/1
300 CAPSULE ORAL DAILY
Qty: 90 CAPSULE | Refills: 0 | Status: SHIPPED | OUTPATIENT
Start: 2023-08-28 | End: 2023-11-14 | Stop reason: SDUPTHER

## 2023-09-24 PROBLEM — M54.50 BACK PAIN, LUMBOSACRAL: Status: ACTIVE | Noted: 2023-09-24

## 2023-09-24 PROBLEM — M25.551 RIGHT HIP PAIN: Status: ACTIVE | Noted: 2023-09-24

## 2023-09-24 PROBLEM — M54.50 BACK PAIN, LUMBOSACRAL: Status: RESOLVED | Noted: 2023-09-24 | Resolved: 2023-09-24

## 2023-09-24 PROBLEM — M19.90 ARTHRITIS: Status: ACTIVE | Noted: 2023-09-24

## 2023-09-24 PROBLEM — E66.9 CLASS 2 OBESITY WITH BODY MASS INDEX (BMI) OF 39.0 TO 39.9 IN ADULT: Status: ACTIVE | Noted: 2023-09-24

## 2023-09-24 PROBLEM — E66.812 CLASS 2 OBESITY WITH BODY MASS INDEX (BMI) OF 39.0 TO 39.9 IN ADULT: Status: ACTIVE | Noted: 2023-09-24

## 2023-09-24 RX ORDER — SODIUM, POTASSIUM,MAG SULFATES 17.5-3.13G
SOLUTION, RECONSTITUTED, ORAL ORAL
COMMUNITY
End: 2023-09-25 | Stop reason: ALTCHOICE

## 2023-09-24 RX ORDER — BRIMONIDINE TARTRATE 1 MG/ML
SOLUTION/ DROPS OPHTHALMIC
COMMUNITY
End: 2023-09-25 | Stop reason: ALTCHOICE

## 2023-09-24 RX ORDER — ASPIRIN 81 MG/1
81 TABLET ORAL DAILY
COMMUNITY
End: 2023-09-25 | Stop reason: ALTCHOICE

## 2023-09-25 ENCOUNTER — OFFICE VISIT (OUTPATIENT)
Dept: PRIMARY CARE | Facility: CLINIC | Age: 73
End: 2023-09-25
Payer: MEDICARE

## 2023-09-25 VITALS
OXYGEN SATURATION: 95 % | DIASTOLIC BLOOD PRESSURE: 62 MMHG | TEMPERATURE: 97.1 F | WEIGHT: 221.2 LBS | BODY MASS INDEX: 39.19 KG/M2 | HEART RATE: 59 BPM | SYSTOLIC BLOOD PRESSURE: 120 MMHG

## 2023-09-25 DIAGNOSIS — D47.2 MGUS (MONOCLONAL GAMMOPATHY OF UNKNOWN SIGNIFICANCE): ICD-10-CM

## 2023-09-25 DIAGNOSIS — E13.9 DIABETES 1.5, MANAGED AS TYPE 2 (MULTI): ICD-10-CM

## 2023-09-25 DIAGNOSIS — E78.5 DYSLIPIDEMIA: ICD-10-CM

## 2023-09-25 DIAGNOSIS — Z23 FLU VACCINE NEED: ICD-10-CM

## 2023-09-25 DIAGNOSIS — L25.1 CONTACT DERMATITIS DUE TO DRUGS IN CONTACT WITH SKIN, UNSPECIFIED CONTACT DERMATITIS TYPE: ICD-10-CM

## 2023-09-25 DIAGNOSIS — I80.9 PHLEBITIS: ICD-10-CM

## 2023-09-25 DIAGNOSIS — E78.00 HYPERCHOLESTEROLEMIA: Primary | ICD-10-CM

## 2023-09-25 PROCEDURE — 90662 IIV NO PRSV INCREASED AG IM: CPT | Performed by: INTERNAL MEDICINE

## 2023-09-25 PROCEDURE — G0008 ADMIN INFLUENZA VIRUS VAC: HCPCS | Performed by: INTERNAL MEDICINE

## 2023-09-25 PROCEDURE — 1159F MED LIST DOCD IN RCRD: CPT | Performed by: INTERNAL MEDICINE

## 2023-09-25 PROCEDURE — 1036F TOBACCO NON-USER: CPT | Performed by: INTERNAL MEDICINE

## 2023-09-25 PROCEDURE — 99214 OFFICE O/P EST MOD 30 MIN: CPT | Performed by: INTERNAL MEDICINE

## 2023-09-25 PROCEDURE — 3078F DIAST BP <80 MM HG: CPT | Performed by: INTERNAL MEDICINE

## 2023-09-25 PROCEDURE — 3044F HG A1C LEVEL LT 7.0%: CPT | Performed by: INTERNAL MEDICINE

## 2023-09-25 PROCEDURE — 1160F RVW MEDS BY RX/DR IN RCRD: CPT | Performed by: INTERNAL MEDICINE

## 2023-09-25 PROCEDURE — 3074F SYST BP LT 130 MM HG: CPT | Performed by: INTERNAL MEDICINE

## 2023-09-25 RX ORDER — CLOTRIMAZOLE AND BETAMETHASONE DIPROPIONATE 10; .64 MG/G; MG/G
1 CREAM TOPICAL 2 TIMES DAILY
Qty: 6 G | Refills: 1 | Status: SHIPPED | OUTPATIENT
Start: 2023-09-25 | End: 2023-11-24

## 2023-09-25 RX ORDER — DAPAGLIFLOZIN 5 MG/1
5 TABLET, FILM COATED ORAL DAILY
Qty: 90 TABLET | Refills: 3 | Status: SHIPPED | OUTPATIENT
Start: 2023-09-25 | End: 2023-11-14 | Stop reason: SINTOL

## 2023-09-25 RX ORDER — DOXYCYCLINE 100 MG/1
100 CAPSULE ORAL 2 TIMES DAILY
Qty: 20 CAPSULE | Refills: 0 | Status: SHIPPED | OUTPATIENT
Start: 2023-09-25 | End: 2023-10-05

## 2023-09-25 ASSESSMENT — ENCOUNTER SYMPTOMS
WHEEZING: 0
ABDOMINAL PAIN: 0
HEADACHES: 0
BLOOD IN STOOL: 0
FATIGUE: 0
PALPITATIONS: 0
ABDOMINAL DISTENTION: 1
DIZZINESS: 0
DIFFICULTY URINATING: 0
COUGH: 0
DIARRHEA: 1
ARTHRALGIAS: 0
BRUISES/BLEEDS EASILY: 0
UNEXPECTED WEIGHT CHANGE: 0
FEVER: 0
SORE THROAT: 0
SINUS PAIN: 0

## 2023-09-25 NOTE — PROGRESS NOTES
"Subjective   Patient ID: Ashli Justice is a 72 y.o. female who presents for Rash (Neck x 1 week after PET scan).     -- Rash on the left side of the neck acute contact dermatitis patient given prescription for Lotrisone cream  - Worsening lower extremity cellulitis and phlebitis patient be started on doxycycline continue with compression hose  - Abnormal serum protein electrophoresis  Patient seen by oncology underlying MUGUS follow-up closely  History of breast cancer remission follow-up hematology oncology in December as recommended  - Diabetes unable to use metformin due to diarrhea patient need to discontinue  Switch patient to Farxiga 5 mg comes about medication side effects continue monitoring closely  - Bilateral carpal tunnel syndrome treated conservatively declined surgical intervention at this time  - Underlying sleep apnea continue with fatigue and tiredness and weight gain declined CPAP declined evaluation by sleep clinic  Morbid obesity  -\" Obesity patient now BMI 39 lost about 10 pounds since last time continue topiramate 50 mg twice a day continue on 1000-calorie reduction  -Neuropathy continues gabapentin 300 mg in p.m.  -Hypertension, improving, counseled about weight loss  -Hypothyroid controlled  -Lymphedema continue with continuous wrapping per lymphedema clinic  -Lumbosacral disease cut down gabapentin take it only at bedtime  -Carotid atherosclerosis ultrasound not changed less than 50% no TIA continue on aspirin marie  Follow-up 3 months     Rash  Associated symptoms include diarrhea. Pertinent negatives include no congestion, cough, fatigue, fever or sore throat.          Review of Systems   Constitutional:  Negative for fatigue, fever and unexpected weight change.   HENT:  Negative for congestion, ear discharge, ear pain, mouth sores, sinus pain and sore throat.    Eyes:  Negative for visual disturbance.   Respiratory:  Negative for cough and wheezing.    Cardiovascular:  Negative for chest " pain, palpitations and leg swelling.   Gastrointestinal:  Positive for abdominal distention and diarrhea. Negative for abdominal pain and blood in stool.   Genitourinary:  Negative for difficulty urinating.   Musculoskeletal:  Negative for arthralgias.   Skin:  Positive for rash.   Neurological:  Negative for dizziness and headaches.   Hematological:  Does not bruise/bleed easily.   Psychiatric/Behavioral:  Negative for behavioral problems.    All other systems reviewed and are negative.      Objective   Lab Results   Component Value Date    HGBA1C 5.9 (A) 06/06/2023      /62   Pulse 59   Temp 36.2 °C (97.1 °F)   Wt 100 kg (221 lb 3.2 oz)   SpO2 95%   BMI 39.19 kg/m²   Lab Results   Component Value Date    WBC 8.2 08/22/2023    HGB 12.3 08/22/2023    HCT 38.6 08/22/2023     08/22/2023    CHOL 126 07/12/2021    TRIG 226 (H) 07/12/2021    HDL 40.0 07/12/2021    ALT 24 08/22/2023    AST 23 08/22/2023     08/22/2023    K 4.3 08/22/2023     (H) 08/22/2023    CREATININE 1.07 (H) 08/22/2023    BUN 29 (H) 08/22/2023    CO2 25 08/22/2023    TSH 3.87 06/06/2023    INR 1.1 05/19/2021    HGBA1C 5.9 (A) 06/06/2023     par   Physical Exam  Vitals and nursing note reviewed.   Constitutional:       Appearance: Normal appearance.   HENT:      Head: Normocephalic.      Nose: Nose normal.   Eyes:      Conjunctiva/sclera: Conjunctivae normal.      Pupils: Pupils are equal, round, and reactive to light.   Cardiovascular:      Rate and Rhythm: Regular rhythm.   Pulmonary:      Effort: Pulmonary effort is normal.      Breath sounds: Normal breath sounds.   Abdominal:      General: Abdomen is flat.      Palpations: Abdomen is soft.   Musculoskeletal:      Cervical back: Neck supple.   Skin:     General: Skin is warm.      Findings: Erythema (Bilateral lower extremity phlebitis) present.   Neurological:      General: No focal deficit present.      Mental Status: She is oriented to person, place, and time.  "  Psychiatric:         Mood and Affect: Mood normal.         Assessment/Plan   Ashli was seen today for rash.  Diagnoses and all orders for this visit:  Hypercholesterolemia (Primary)  Flu vaccine need  -     Flu vaccine, quadrivalent, high-dose, preservative free, age 65y+ (FLUZONE)  MGUS (monoclonal gammopathy of unknown significance)  Diabetes 1.5, managed as type 2 (CMS/HCC)  -     dapagliflozin propanediol (Farxiga) 5 mg; Take 1 tablet (5 mg) by mouth once daily.  Dyslipidemia  Phlebitis  -     doxycycline (Vibramycin) 100 mg capsule; Take 1 capsule (100 mg) by mouth 2 times a day for 10 days. Take with at least 8 ounces (large glass) of water, do not lie down for 30 minutes after  Contact dermatitis due to drugs in contact with skin, unspecified contact dermatitis type  -     clotrimazole-betamethasone (Lotrisone) cream; Apply 1 Application topically 2 times a day.  Other orders  -     Follow Up In Primary Care - Established; Future    -- Rash on the left side of the neck acute contact dermatitis patient given prescription for Lotrisone cream  - Worsening lower extremity cellulitis and phlebitis patient be started on doxycycline continue with compression hose  - Abnormal serum protein electrophoresis  Patient seen by oncology underlying MUGUS follow-up closely  History of breast cancer remission follow-up hematology oncology in December as recommended  - Diabetes unable to use metformin due to diarrhea patient need to discontinue  Switch patient to Farxiga 5 mg comes about medication side effects continue monitoring closely  - Bilateral carpal tunnel syndrome treated conservatively declined surgical intervention at this time  - Underlying sleep apnea continue with fatigue and tiredness and weight gain declined CPAP declined evaluation by sleep clinic  Morbid obesity  -\" Obesity patient now BMI 39 lost about 10 pounds since last time continue topiramate 50 mg twice a day continue on 1000-calorie " reduction  -Neuropathy continues gabapentin 300 mg in p.m.  -Hypertension, improving, counseled about weight loss  -Hypothyroid controlled  -Lymphedema continue with continuous wrapping per lymphedema clinic  -Lumbosacral disease cut down gabapentin take it only at bedtime  -Carotid atherosclerosis ultrasound not changed less than 50% no TIA continue on aspirin marie  Follow-up 3 months

## 2023-09-28 DIAGNOSIS — E78.00 HYPERCHOLESTEROLEMIA: ICD-10-CM

## 2023-09-28 RX ORDER — ROSUVASTATIN CALCIUM 10 MG/1
10 TABLET, COATED ORAL DAILY
Qty: 90 TABLET | Refills: 0 | Status: SHIPPED | OUTPATIENT
Start: 2023-09-28 | End: 2023-12-21 | Stop reason: SDUPTHER

## 2023-10-24 DIAGNOSIS — I10 BENIGN ESSENTIAL HYPERTENSION: ICD-10-CM

## 2023-10-25 RX ORDER — CARVEDILOL 25 MG/1
25 TABLET ORAL DAILY
Qty: 90 TABLET | Refills: 0 | Status: SHIPPED | OUTPATIENT
Start: 2023-10-25 | End: 2023-12-21 | Stop reason: SDUPTHER

## 2023-11-03 ENCOUNTER — OFFICE VISIT (OUTPATIENT)
Dept: PRIMARY CARE | Facility: CLINIC | Age: 73
End: 2023-11-03
Payer: MEDICARE

## 2023-11-03 VITALS
DIASTOLIC BLOOD PRESSURE: 56 MMHG | HEART RATE: 59 BPM | SYSTOLIC BLOOD PRESSURE: 110 MMHG | BODY MASS INDEX: 38.95 KG/M2 | OXYGEN SATURATION: 94 % | WEIGHT: 219.8 LBS | TEMPERATURE: 97.1 F

## 2023-11-03 DIAGNOSIS — L03.90 CELLULITIS, UNSPECIFIED CELLULITIS SITE: Primary | ICD-10-CM

## 2023-11-03 PROCEDURE — 1160F RVW MEDS BY RX/DR IN RCRD: CPT | Performed by: NURSE PRACTITIONER

## 2023-11-03 PROCEDURE — 1036F TOBACCO NON-USER: CPT | Performed by: NURSE PRACTITIONER

## 2023-11-03 PROCEDURE — 3078F DIAST BP <80 MM HG: CPT | Performed by: NURSE PRACTITIONER

## 2023-11-03 PROCEDURE — 99214 OFFICE O/P EST MOD 30 MIN: CPT | Performed by: NURSE PRACTITIONER

## 2023-11-03 PROCEDURE — 3074F SYST BP LT 130 MM HG: CPT | Performed by: NURSE PRACTITIONER

## 2023-11-03 PROCEDURE — 1159F MED LIST DOCD IN RCRD: CPT | Performed by: NURSE PRACTITIONER

## 2023-11-03 RX ORDER — DOXYCYCLINE 100 MG/1
100 TABLET ORAL 2 TIMES DAILY
Qty: 14 TABLET | Refills: 0 | Status: SHIPPED | OUTPATIENT
Start: 2023-11-03 | End: 2023-11-10

## 2023-11-03 NOTE — PROGRESS NOTES
Subjective   Patient ID: Ashli Justice is a 73 y.o. female who presents for Cellulitis (Bilateral lower legs, red and burning, a little warm to touch x 2 weeks).    Patient of Dr. DAMON Rachel. This is the first time I have seen this patient.  Chronic bilateral lower leg swelling, lymphedema.  Bilateral lower legs have been more red for the last couple weeks.  Cellulitis, start doxycycline 100 mg 2 times a day for to 7 days.  Recommend compression stockings, elevate legs several times a day, low-sodium diet.             Review of Systems   Constitutional:  Negative for activity change, chills, fatigue and fever.   HENT:  Negative for congestion, rhinorrhea, sinus pressure, sinus pain and sore throat.    Eyes: Negative.    Respiratory:  Negative for cough, chest tightness, shortness of breath and wheezing.    Cardiovascular:  Positive for leg swelling. Negative for chest pain and palpitations.   Gastrointestinal:  Negative for abdominal distention, abdominal pain, constipation, diarrhea, nausea and vomiting.   Endocrine: Negative.    Genitourinary: Negative.    Musculoskeletal: Negative.    Skin:  Positive for wound.   Allergic/Immunologic: Negative.    Neurological:  Negative for dizziness, syncope, weakness, light-headedness, numbness and headaches.   Hematological: Negative.    Psychiatric/Behavioral: Negative.     All other systems reviewed and are negative.      Objective   /56   Pulse 59   Temp 36.2 °C (97.1 °F)   Wt 99.7 kg (219 lb 12.8 oz)   SpO2 94%   BMI 38.95 kg/m²     Physical Exam  Vitals and nursing note reviewed.   Constitutional:       General: She is not in acute distress.     Appearance: Normal appearance. She is not ill-appearing.   HENT:      Head: Normocephalic and atraumatic.      Right Ear: Tympanic membrane, ear canal and external ear normal.      Left Ear: Tympanic membrane, ear canal and external ear normal.      Nose: Nose normal.      Mouth/Throat:      Mouth: Mucous membranes are  moist.      Pharynx: Oropharynx is clear.   Eyes:      Pupils: Pupils are equal, round, and reactive to light.   Cardiovascular:      Rate and Rhythm: Normal rate and regular rhythm.      Pulses: Normal pulses.      Heart sounds: Normal heart sounds. No murmur heard.  Pulmonary:      Effort: Pulmonary effort is normal. No respiratory distress.      Breath sounds: Normal breath sounds. No wheezing.   Abdominal:      General: Bowel sounds are normal. There is no distension.      Palpations: Abdomen is soft.      Tenderness: There is no abdominal tenderness.   Musculoskeletal:         General: No tenderness. Normal range of motion.      Cervical back: Normal range of motion and neck supple.      Right lower le+ Edema present.      Left lower le+ Edema present.   Skin:     General: Skin is warm and dry.      Capillary Refill: Capillary refill takes less than 2 seconds.      Coloration: Skin is not jaundiced.      Findings: Erythema (cellulitis bilateral lower legs) present.   Neurological:      General: No focal deficit present.      Mental Status: She is alert and oriented to person, place, and time.      Motor: No weakness.   Psychiatric:         Mood and Affect: Mood normal.         Behavior: Behavior normal.         Thought Content: Thought content normal.         Judgment: Judgment normal.         Assessment/Plan     #Bilateral lower leg cellulitis  -Start doxycycline 100 mg twice a day for 7 days  -Elevate legs several times throughout the day  -Compression stockings  -Call the office if symptoms worsen or do not improve    Follow-up with Dr. Rachel as scheduled in December and as needed

## 2023-11-05 ASSESSMENT — ENCOUNTER SYMPTOMS
COUGH: 0
NAUSEA: 0
PALPITATIONS: 0
MUSCULOSKELETAL NEGATIVE: 1
ABDOMINAL PAIN: 0
ALLERGIC/IMMUNOLOGIC NEGATIVE: 1
LIGHT-HEADEDNESS: 0
RHINORRHEA: 0
PSYCHIATRIC NEGATIVE: 1
HEMATOLOGIC/LYMPHATIC NEGATIVE: 1
SINUS PRESSURE: 0
CHEST TIGHTNESS: 0
WOUND: 1
CHILLS: 0
FATIGUE: 0
SINUS PAIN: 0
ACTIVITY CHANGE: 0
EYES NEGATIVE: 1
SORE THROAT: 0
ABDOMINAL DISTENTION: 0
VOMITING: 0
WHEEZING: 0
CONSTIPATION: 0
FEVER: 0
DIARRHEA: 0
DIZZINESS: 0
HEADACHES: 0
WEAKNESS: 0
SHORTNESS OF BREATH: 0
NUMBNESS: 0
ENDOCRINE NEGATIVE: 1

## 2023-11-08 ENCOUNTER — APPOINTMENT (OUTPATIENT)
Dept: PRIMARY CARE | Facility: CLINIC | Age: 73
End: 2023-11-08
Payer: MEDICARE

## 2023-11-10 DIAGNOSIS — E03.9 HYPOTHYROIDISM, UNSPECIFIED TYPE: ICD-10-CM

## 2023-11-12 RX ORDER — LEVOTHYROXINE SODIUM 50 UG/1
50 TABLET ORAL DAILY
Qty: 90 TABLET | Refills: 0 | Status: SHIPPED | OUTPATIENT
Start: 2023-11-12 | End: 2024-02-07 | Stop reason: SDUPTHER

## 2023-11-13 ENCOUNTER — OFFICE VISIT (OUTPATIENT)
Dept: RHEUMATOLOGY | Facility: CLINIC | Age: 73
End: 2023-11-13
Payer: MEDICARE

## 2023-11-13 VITALS
BODY MASS INDEX: 38.95 KG/M2 | WEIGHT: 219.8 LBS | SYSTOLIC BLOOD PRESSURE: 133 MMHG | HEIGHT: 63 IN | DIASTOLIC BLOOD PRESSURE: 82 MMHG | HEART RATE: 52 BPM

## 2023-11-13 DIAGNOSIS — R76.8 POSITIVE ANA (ANTINUCLEAR ANTIBODY): Primary | ICD-10-CM

## 2023-11-13 DIAGNOSIS — M25.59 PAIN IN OTHER SPECIFIED JOINT: ICD-10-CM

## 2023-11-13 PROCEDURE — 3075F SYST BP GE 130 - 139MM HG: CPT | Performed by: INTERNAL MEDICINE

## 2023-11-13 PROCEDURE — 1036F TOBACCO NON-USER: CPT | Performed by: INTERNAL MEDICINE

## 2023-11-13 PROCEDURE — 1159F MED LIST DOCD IN RCRD: CPT | Performed by: INTERNAL MEDICINE

## 2023-11-13 PROCEDURE — 1160F RVW MEDS BY RX/DR IN RCRD: CPT | Performed by: INTERNAL MEDICINE

## 2023-11-13 PROCEDURE — 99204 OFFICE O/P NEW MOD 45 MIN: CPT | Performed by: INTERNAL MEDICINE

## 2023-11-13 PROCEDURE — 3079F DIAST BP 80-89 MM HG: CPT | Performed by: INTERNAL MEDICINE

## 2023-11-13 RX ORDER — GLUCOSAM/CHONDRO/HERB 149/HYAL 750-100 MG
1000 TABLET ORAL DAILY
COMMUNITY

## 2023-11-13 RX ORDER — VITAMIN E MIXED 400 UNIT
5000 CAPSULE ORAL DAILY
COMMUNITY
End: 2023-12-14 | Stop reason: ALTCHOICE

## 2023-11-13 ASSESSMENT — ENCOUNTER SYMPTOMS
LOSS OF SENSATION IN FEET: 1
EYE REDNESS: 1
COUGH: 1
DEPRESSION: 0
OCCASIONAL FEELINGS OF UNSTEADINESS: 0

## 2023-11-13 NOTE — PROGRESS NOTES
Subjective   Patient ID: Ashli Justice is a 73 y.o. female who presents for New Patient Visit (Patient presents for initial visit. She is referred by Dr. DAMON Yadav for shoulder, back, and hip pain.).  HPI  Patient with history of positive AUDREY symptoms in the shoulders, back, and hips.  She does have a history of lymphedema, obesity, sleep apnea not on CPAP, diabetes, carpal tunnel syndrome.  Patient was found to have MGUS    She has had previous hip replacement in 2015 and lumbar spine laminectomy in 2021.    She is currently on topiramate 25 mg twice daily, gabapentin 300 mg daily.  Had been on higher doses of gabapentin in the past.  She had been prescribed gabapentin for pain.  She feels tylenol helps more.      Her feet and hands do get numb.  She wears a wrist splint on her hands at night.  When she reads she can't hold a book b/c her hand will go numb    She cannot stand straight.  She cannot walk very long b/c of her back.  She can't walk straight.  If she has a grocery cart she can walk for an hour.      Her spine surgery in 2021 didn't really improve her symptoms.      She recently had cellulitis in her bilateral lower extremities.  She does have lower extremity edema.    Patient was recently found to have MGUS.  She did have a PET scan in August 2023.  That did not show any abnormal hypermetabolic activity.  She did have lower extremity skin thickening and stranding that could be due to cellulitis.  She got a rash after the PET on the left side of her neck.      I did see the patient in December 2014 for complaints of left hip pain and knee pain.  I felt that her symptoms at the time were due to degenerative arthritis of the left hip and referred her to orthopedics.    She had been seeing podiatry to have her toenails cut.  One nail was taken out b/c was ingrown and took time healing.  The podiatrist had done blood work and was found to have positive AUDREY.  Had other blood work and eventually was referred to  hematology.    Still has trouble with cellulitis.  They are red and swollen in the am.  She uses compression socks    Social history includes past tobacco use.  Denies any alcohol or illicit drug use.  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    Family history includes heart disease, hypertension, COPD, stroke.      Review of Systems   Constitutional:         Has had 1 to 2 pound weight change   HENT:  Negative for mouth sores.    Eyes:  Positive for redness.   Respiratory:  Positive for cough.    Cardiovascular:  Negative for chest pain and leg swelling.   Musculoskeletal:         Per HPI   Skin:  Negative for rash.       Objective   Physical Exam  GEN: NAD A&O x3 appropriate affect  HENT:no enlarged glands or thyroid  EYES:  no conjunctival redness, eyelids normal  LYMPH: no cervical lymphadenopathy  NEURO: 5/5 strength upper and lower extremities, DTR +2 bicep and patellar tendons  SKIN: no rashes, ulcers, or subcutaneous nodules  PULSES: +radials  TENDER POINTS: 0/18   MSK:  Squaring of bilateral CMC's no swelling MCPs hypertrophic changes in DIP and PIP no swelling elbows  Anterior rotation bilateral shoulders  Kyphosis of the thoracic spine  Decreased range of motion of the cervical spine  Lower extremity edema wearing compression socks  Hypertrophic changes in bilateral knees  Assessment/Plan        Patient with positive AUDREY of uncertain clinical significance.  Has also been found to have MGUS.  She has chronic lower extremity edema and cellulitis.  She does have hypothyroidism so we will check for chronic autoimmune thyroiditis.  She does not have an appearance of an inflammatory arthritis in her hands but more of osteoarthritis.  Does not have other issues such as sicca or rash.  We will check an NEPTALI panel.  She has had 1 miscarriage we will check for antiphospholipid syndrome.    We will discuss her results once they return.

## 2023-11-14 ENCOUNTER — LAB (OUTPATIENT)
Dept: LAB | Facility: LAB | Age: 73
End: 2023-11-14
Payer: MEDICARE

## 2023-11-14 ENCOUNTER — OFFICE VISIT (OUTPATIENT)
Dept: PRIMARY CARE | Facility: CLINIC | Age: 73
End: 2023-11-14
Payer: MEDICARE

## 2023-11-14 VITALS
WEIGHT: 221.8 LBS | BODY MASS INDEX: 39.29 KG/M2 | TEMPERATURE: 97.3 F | HEART RATE: 61 BPM | OXYGEN SATURATION: 92 % | SYSTOLIC BLOOD PRESSURE: 138 MMHG | DIASTOLIC BLOOD PRESSURE: 62 MMHG

## 2023-11-14 DIAGNOSIS — I89.0 LYMPHEDEMA DUE TO CHRONIC INFLAMMATION: Primary | ICD-10-CM

## 2023-11-14 DIAGNOSIS — R76.8 POSITIVE ANA (ANTINUCLEAR ANTIBODY): ICD-10-CM

## 2023-11-14 DIAGNOSIS — G62.9 NEUROPATHY: ICD-10-CM

## 2023-11-14 DIAGNOSIS — E78.5 DYSLIPIDEMIA: ICD-10-CM

## 2023-11-14 DIAGNOSIS — G47.30 SLEEP APNEA, UNSPECIFIED TYPE: ICD-10-CM

## 2023-11-14 DIAGNOSIS — E55.9 VITAMIN D DEFICIENCY: ICD-10-CM

## 2023-11-14 DIAGNOSIS — D47.2 MGUS (MONOCLONAL GAMMOPATHY OF UNKNOWN SIGNIFICANCE): ICD-10-CM

## 2023-11-14 DIAGNOSIS — M25.59 PAIN IN OTHER SPECIFIED JOINT: ICD-10-CM

## 2023-11-14 DIAGNOSIS — M51.36 LUMBAR DEGENERATIVE DISC DISEASE: ICD-10-CM

## 2023-11-14 LAB
BASOPHILS # BLD AUTO: 0.05 X10*3/UL (ref 0–0.1)
BASOPHILS NFR BLD AUTO: 0.6 %
EOSINOPHIL # BLD AUTO: 0.08 X10*3/UL (ref 0–0.4)
EOSINOPHIL NFR BLD AUTO: 1 %
ERYTHROCYTE [DISTWIDTH] IN BLOOD BY AUTOMATED COUNT: 15.2 % (ref 11.5–14.5)
HCT VFR BLD AUTO: 43.8 % (ref 36–46)
HGB BLD-MCNC: 13.2 G/DL (ref 12–16)
IMM GRANULOCYTES # BLD AUTO: 0.02 X10*3/UL (ref 0–0.5)
IMM GRANULOCYTES NFR BLD AUTO: 0.3 % (ref 0–0.9)
LYMPHOCYTES # BLD AUTO: 2.15 X10*3/UL (ref 0.8–3)
LYMPHOCYTES NFR BLD AUTO: 27.5 %
MCH RBC QN AUTO: 29.9 PG (ref 26–34)
MCHC RBC AUTO-ENTMCNC: 30.1 G/DL (ref 32–36)
MCV RBC AUTO: 99 FL (ref 80–100)
MONOCYTES # BLD AUTO: 0.58 X10*3/UL (ref 0.05–0.8)
MONOCYTES NFR BLD AUTO: 7.4 %
NEUTROPHILS # BLD AUTO: 4.94 X10*3/UL (ref 1.6–5.5)
NEUTROPHILS NFR BLD AUTO: 63.2 %
NRBC BLD-RTO: 0 /100 WBCS (ref 0–0)
PLATELET # BLD AUTO: 254 X10*3/UL (ref 150–450)
RBC # BLD AUTO: 4.41 X10*6/UL (ref 4–5.2)
WBC # BLD AUTO: 7.8 X10*3/UL (ref 4.4–11.3)

## 2023-11-14 PROCEDURE — 3078F DIAST BP <80 MM HG: CPT | Performed by: INTERNAL MEDICINE

## 2023-11-14 PROCEDURE — 86376 MICROSOMAL ANTIBODY EACH: CPT

## 2023-11-14 PROCEDURE — 86431 RHEUMATOID FACTOR QUANT: CPT

## 2023-11-14 PROCEDURE — 86147 CARDIOLIPIN ANTIBODY EA IG: CPT

## 2023-11-14 PROCEDURE — 1036F TOBACCO NON-USER: CPT | Performed by: INTERNAL MEDICINE

## 2023-11-14 PROCEDURE — 3075F SYST BP GE 130 - 139MM HG: CPT | Performed by: INTERNAL MEDICINE

## 2023-11-14 PROCEDURE — 99214 OFFICE O/P EST MOD 30 MIN: CPT | Performed by: INTERNAL MEDICINE

## 2023-11-14 PROCEDURE — 86160 COMPLEMENT ANTIGEN: CPT

## 2023-11-14 PROCEDURE — 86146 BETA-2 GLYCOPROTEIN ANTIBODY: CPT

## 2023-11-14 PROCEDURE — 85730 THROMBOPLASTIN TIME PARTIAL: CPT

## 2023-11-14 PROCEDURE — 85613 RUSSELL VIPER VENOM DILUTED: CPT

## 2023-11-14 PROCEDURE — 36415 COLL VENOUS BLD VENIPUNCTURE: CPT

## 2023-11-14 PROCEDURE — 1159F MED LIST DOCD IN RCRD: CPT | Performed by: INTERNAL MEDICINE

## 2023-11-14 PROCEDURE — 86200 CCP ANTIBODY: CPT

## 2023-11-14 PROCEDURE — 86225 DNA ANTIBODY NATIVE: CPT

## 2023-11-14 PROCEDURE — 86235 NUCLEAR ANTIGEN ANTIBODY: CPT

## 2023-11-14 PROCEDURE — 86038 ANTINUCLEAR ANTIBODIES: CPT

## 2023-11-14 PROCEDURE — 1160F RVW MEDS BY RX/DR IN RCRD: CPT | Performed by: INTERNAL MEDICINE

## 2023-11-14 RX ORDER — GABAPENTIN 300 MG/1
300 CAPSULE ORAL DAILY
Qty: 90 CAPSULE | Refills: 0 | Status: SHIPPED | OUTPATIENT
Start: 2023-11-14 | End: 2024-02-16 | Stop reason: SDUPTHER

## 2023-11-14 RX ORDER — TOPIRAMATE 25 MG/1
25 TABLET ORAL 2 TIMES DAILY
Qty: 28 TABLET | Refills: 0 | Status: SHIPPED | OUTPATIENT
Start: 2023-11-14 | End: 2023-12-07 | Stop reason: SDUPTHER

## 2023-11-14 RX ORDER — CHOLECALCIFEROL (VITAMIN D3) 125 MCG
125 CAPSULE ORAL DAILY
Qty: 90 CAPSULE | Refills: 1 | Status: SHIPPED | OUTPATIENT
Start: 2023-11-14 | End: 2024-04-22 | Stop reason: SDUPTHER

## 2023-11-14 ASSESSMENT — ENCOUNTER SYMPTOMS
WHEEZING: 0
BRUISES/BLEEDS EASILY: 0
WOUND: 1
SORE THROAT: 0
UNEXPECTED WEIGHT CHANGE: 0
PALPITATIONS: 0
FATIGUE: 0
ABDOMINAL PAIN: 0
FEVER: 0
BLOOD IN STOOL: 0
DIFFICULTY URINATING: 0
SINUS PAIN: 0
DIARRHEA: 0
HEADACHES: 0
COUGH: 0
ARTHRALGIAS: 0
DIZZINESS: 0

## 2023-11-14 NOTE — PROGRESS NOTES
"Subjective   Patient ID: Ashli Justice is a 73 y.o. female who presents for Cellulitis (Bilateral legs, worse in am, burning, warmth, redness, itching).    -- Chronic bilateral lower extremity lymphedema  Patient comes about daily dressing with Aloe Vesta 3 continue with compression hose keep the legs elevated  -History of lumbosacral surgery and chronic neuropathy  Patient to continue gabapentin 300 mg at bedtime  - Patient on topiramate 25 mg 2 tablets twice a day patient wants to wean off and not effective aware of risk and benefit patient to be weaned off slowly 25 mg twice a day for 1 week then 20 to 25 mg at bedtime then.  -Diabetes and leg swelling patient did not tolerate metformin due to diarrhea now on Farxiga very expensive patient will discontinue at this time continue with diet controlled reevaluate patient in 3 months arrange with hemoglobin A1c next appointment  - Recurrent phlebitis and superficial cellulitis now controlled to continue with daily dressing  - Patient seen by oncology underlying MUGUS follow-up closely  - Seen by rheumatology awaiting further work-up unlikely to have autoimmune arthritis  -History of breast cancer remission follow-up hematology oncology in December as recommended  -- Bilateral carpal tunnel syndrome treated conservatively declined surgical intervention at this time  - Underlying sleep apnea continue with fatigue and tiredness and weight gain declined CPAP declined evaluation by sleep clinic  Morbid obesity  -\" Obesity patient now BMI 39 lost about 10 pounds since last time continue topiramate 50 mg twice a day continue on 1000-calorie reduction  -Neuropathy continues gabapentin 300 mg in p.m.  -Hypertension, improving, counseled about weight loss  -Hypothyroid controlled  -Lymphedema continue with continuous wrapping per lymphedema clinic  -Lumbosacral disease cut down gabapentin take it only at bedtime  -Carotid atherosclerosis ultrasound not changed less than 50% no " TIA continue on aspirin marie  Follow-up 3 months                    Review of Systems   Constitutional:  Negative for fatigue, fever and unexpected weight change.   HENT:  Negative for congestion, ear discharge, ear pain, mouth sores, sinus pain and sore throat.    Eyes:  Negative for visual disturbance.   Respiratory:  Negative for cough and wheezing.    Cardiovascular:  Negative for chest pain, palpitations and leg swelling.   Gastrointestinal:  Negative for abdominal pain, blood in stool and diarrhea.   Genitourinary:  Negative for difficulty urinating.   Musculoskeletal:  Negative for arthralgias.   Skin:  Positive for wound. Negative for rash.   Neurological:  Negative for dizziness and headaches.   Hematological:  Does not bruise/bleed easily.   Psychiatric/Behavioral:  Negative for behavioral problems.    All other systems reviewed and are negative.      Objective   Lab Results   Component Value Date    HGBA1C 5.9 (A) 06/06/2023      /62   Pulse 61   Temp 36.3 °C (97.3 °F)   Wt 101 kg (221 lb 12.8 oz)   SpO2 92%   BMI 39.29 kg/m²     Physical Exam  Vitals and nursing note reviewed.   Constitutional:       Appearance: Normal appearance.   HENT:      Head: Normocephalic.      Nose: Nose normal.   Eyes:      Conjunctiva/sclera: Conjunctivae normal.      Pupils: Pupils are equal, round, and reactive to light.   Cardiovascular:      Rate and Rhythm: Regular rhythm.   Pulmonary:      Effort: Pulmonary effort is normal.      Breath sounds: Normal breath sounds.   Abdominal:      General: Abdomen is flat.      Palpations: Abdomen is soft.   Musculoskeletal:      Cervical back: Neck supple.      Right lower leg: Edema present.      Left lower leg: Edema present.   Skin:     General: Skin is warm.      Findings: Rash present.   Neurological:      General: No focal deficit present.      Mental Status: She is oriented to person, place, and time.   Psychiatric:         Mood and Affect: Mood normal.          Assessment/Plan   Ashli was seen today for cellulitis.  Diagnoses and all orders for this visit:  Lymphedema due to chronic inflammation (Primary)  Neuropathy  -     topiramate (Topamax) 25 mg tablet; Take 1 tablet (25 mg) by mouth 2 times a day for 14 days.  Vitamin D deficiency  -     cholecalciferol (Vitamin D-3) 125 MCG (5000 UT) capsule; Take 1 capsule (125 mcg) by mouth once daily.  Lumbar degenerative disc disease  -     gabapentin (Neurontin) 300 mg capsule; Take 1 capsule (300 mg) by mouth once daily.  Dyslipidemia  MGUS (monoclonal gammopathy of unknown significance)  Sleep apnea, unspecified type  Other orders  -     Follow Up In Primary Care - Established; Future   -- Chronic bilateral lower extremity lymphedema  Patient comes about daily dressing with Aloe Vesta 3 continue with compression hose keep the legs elevated  -History of lumbosacral surgery and chronic neuropathy  Patient to continue gabapentin 300 mg at bedtime  - Patient on topiramate 25 mg 2 tablets twice a day patient wants to wean off and not effective aware of risk and benefit patient to be weaned off slowly 25 mg twice a day for 1 week then 20 to 25 mg at bedtime then.  -Diabetes and leg swelling patient did not tolerate metformin due to diarrhea now on Farxiga very expensive patient will discontinue at this time continue with diet controlled reevaluate patient in 3 months arrange with hemoglobin A1c next appointment  - Recurrent phlebitis and superficial cellulitis now controlled to continue with daily dressing  - Patient seen by oncology underlying MUGUS follow-up closely  - Seen by rheumatology awaiting further work-up unlikely to have autoimmune arthritis  -History of breast cancer remission follow-up hematology oncology in December as recommended  -- Bilateral carpal tunnel syndrome treated conservatively declined surgical intervention at this time  - Underlying sleep apnea continue with fatigue and tiredness and weight gain  "declined CPAP declined evaluation by sleep clinic  Morbid obesity  -\" Obesity patient now BMI 39 lost about 10 pounds since last time continue topiramate 50 mg twice a day continue on 1000-calorie reduction  -Neuropathy continues gabapentin 300 mg in p.m.  -Hypertension, improving, counseled about weight loss  -Hypothyroid controlled  -Lymphedema continue with continuous wrapping per lymphedema clinic  -Lumbosacral disease cut down gabapentin take it only at bedtime  -Carotid atherosclerosis ultrasound not changed less than 50% no TIA continue on aspirin marie  Follow-up 3 months           "

## 2023-11-15 LAB
ANA PATTERN: ABNORMAL
ANA SER QL HEP2 SUBST: POSITIVE
ANA TITR SER IF: ABNORMAL {TITER}
B2 GLYCOPROT1 IGA SER-ACNC: <0.6 U/ML
B2 GLYCOPROT1 IGG SER-ACNC: <1.4 U/ML
B2 GLYCOPROT1 IGM SER-ACNC: 1.8 U/ML
C3 SERPL-MCNC: 165 MG/DL (ref 87–200)
C4 SERPL-MCNC: 27 MG/DL (ref 10–50)
CARDIOLIPIN IGA SERPL-ACNC: 1.2 APL U/ML
CARDIOLIPIN IGG SER IA-ACNC: <1.6 GPL U/ML
CARDIOLIPIN IGM SER IA-ACNC: 1.6 MPL U/ML
CCP IGG SERPL-ACNC: <1 U/ML
CENTROMERE B AB SER-ACNC: <0.2 AI
CHROMATIN AB SERPL-ACNC: <0.2 AI
DSDNA AB SER-ACNC: <1 IU/ML
ENA JO1 AB SER QL IA: <0.2 AI
ENA RNP AB SER IA-ACNC: <0.2 AI
ENA SCL70 AB SER QL IA: <0.2 AI
ENA SM AB SER IA-ACNC: <0.2 AI
ENA SM+RNP AB SER QL IA: <0.2 AI
ENA SS-A AB SER IA-ACNC: <0.2 AI
ENA SS-B AB SER IA-ACNC: <0.2 AI
RHEUMATOID FACT SER NEPH-ACNC: 12 IU/ML (ref 0–15)
RIBOSOMAL P AB SER-ACNC: <0.2 AI
THYROPEROXIDASE AB SERPL-ACNC: <28 IU/ML

## 2023-11-16 LAB
DRVVT SCREEN TO CONFIRM RATIO: 1.06 RATIO
DRVVT/DRVVT CFM NRMLZD PPP-RTO: 1.04 RATIO
DRVVT/DRVVT CFM P DOAC NEUT NORM PPP-RTO: 1.03 RATIO
LA 2 SCREEN W REFLEX-IMP: NORMAL
NORMALIZED SCT PPP-RTO: 0.84 RATIO
SILICA CLOTTING TIME CONFIRMATION: 1.08 RATIO
SILICA CLOTTING TIME SCREEN: 0.91 RATIO

## 2023-11-27 ENCOUNTER — TELEPHONE (OUTPATIENT)
Dept: PRIMARY CARE | Facility: CLINIC | Age: 73
End: 2023-11-27
Payer: MEDICARE

## 2023-11-27 NOTE — TELEPHONE ENCOUNTER
Patient feels she needs to continue with Topiramate tried weaning off and having side effects so started taking again at regular dose.

## 2023-11-30 DIAGNOSIS — G25.81 RESTLESS LEG SYNDROME: ICD-10-CM

## 2023-11-30 RX ORDER — PRAMIPEXOLE DIHYDROCHLORIDE 1.5 MG/1
0.75 TABLET ORAL NIGHTLY
Qty: 45 TABLET | Refills: 0 | Status: SHIPPED | OUTPATIENT
Start: 2023-11-30 | End: 2024-02-16 | Stop reason: SDUPTHER

## 2023-12-07 DIAGNOSIS — G62.9 NEUROPATHY: ICD-10-CM

## 2023-12-08 RX ORDER — TOPIRAMATE 25 MG/1
25 TABLET ORAL 2 TIMES DAILY
Qty: 28 TABLET | Refills: 0 | Status: SHIPPED | OUTPATIENT
Start: 2023-12-08 | End: 2024-04-08

## 2023-12-08 RX ORDER — TOPIRAMATE 25 MG/1
25 TABLET ORAL 2 TIMES DAILY
Qty: 28 TABLET | Refills: 0 | Status: SHIPPED | OUTPATIENT
Start: 2023-12-08 | End: 2023-12-21 | Stop reason: SDUPTHER

## 2023-12-11 ENCOUNTER — HOSPITAL ENCOUNTER (OUTPATIENT)
Dept: RADIOLOGY | Facility: HOSPITAL | Age: 73
Discharge: HOME | End: 2023-12-11
Payer: MEDICARE

## 2023-12-11 DIAGNOSIS — C50.912 MALIGNANT NEOPLASM OF UNSPECIFIED SITE OF LEFT FEMALE BREAST (MULTI): ICD-10-CM

## 2023-12-11 DIAGNOSIS — Z85.3 PERSONAL HISTORY OF MALIGNANT NEOPLASM OF BREAST: ICD-10-CM

## 2023-12-11 PROCEDURE — 77066 DX MAMMO INCL CAD BI: CPT | Performed by: RADIOLOGY

## 2023-12-11 PROCEDURE — 77062 BREAST TOMOSYNTHESIS BI: CPT

## 2023-12-11 PROCEDURE — G0279 TOMOSYNTHESIS, MAMMO: HCPCS | Performed by: RADIOLOGY

## 2023-12-14 ENCOUNTER — LAB (OUTPATIENT)
Dept: LAB | Facility: LAB | Age: 73
End: 2023-12-14
Payer: MEDICARE

## 2023-12-14 ENCOUNTER — OFFICE VISIT (OUTPATIENT)
Dept: HEMATOLOGY/ONCOLOGY | Facility: HOSPITAL | Age: 73
End: 2023-12-14
Payer: MEDICARE

## 2023-12-14 VITALS
HEIGHT: 63 IN | WEIGHT: 220.46 LBS | TEMPERATURE: 97.5 F | HEART RATE: 56 BPM | BODY MASS INDEX: 39.06 KG/M2 | RESPIRATION RATE: 18 BRPM | SYSTOLIC BLOOD PRESSURE: 147 MMHG | DIASTOLIC BLOOD PRESSURE: 71 MMHG | OXYGEN SATURATION: 95 %

## 2023-12-14 DIAGNOSIS — D47.2 MGUS (MONOCLONAL GAMMOPATHY OF UNKNOWN SIGNIFICANCE): ICD-10-CM

## 2023-12-14 DIAGNOSIS — I10 HYPERTENSION, UNSPECIFIED TYPE: ICD-10-CM

## 2023-12-14 DIAGNOSIS — Z17.0 MALIGNANT NEOPLASM OF LEFT BREAST IN FEMALE, ESTROGEN RECEPTOR POSITIVE, UNSPECIFIED SITE OF BREAST (MULTI): Primary | ICD-10-CM

## 2023-12-14 DIAGNOSIS — C50.912 MALIGNANT NEOPLASM OF LEFT BREAST IN FEMALE, ESTROGEN RECEPTOR POSITIVE, UNSPECIFIED SITE OF BREAST (MULTI): Primary | ICD-10-CM

## 2023-12-14 DIAGNOSIS — K63.5 POLYP OF COLON, UNSPECIFIED PART OF COLON, UNSPECIFIED TYPE: ICD-10-CM

## 2023-12-14 DIAGNOSIS — Z91.199 NONCOMPLIANCE: ICD-10-CM

## 2023-12-14 LAB
ALBUMIN SERPL BCP-MCNC: 4.3 G/DL (ref 3.4–5)
ALP SERPL-CCNC: 88 U/L (ref 33–136)
ALT SERPL W P-5'-P-CCNC: 23 U/L (ref 7–45)
ANION GAP SERPL CALC-SCNC: 16 MMOL/L (ref 10–20)
AST SERPL W P-5'-P-CCNC: 21 U/L (ref 9–39)
BILIRUB SERPL-MCNC: 0.3 MG/DL (ref 0–1.2)
BUN SERPL-MCNC: 44 MG/DL (ref 6–23)
CALCIUM SERPL-MCNC: 9.3 MG/DL (ref 8.6–10.3)
CHLORIDE SERPL-SCNC: 108 MMOL/L (ref 98–107)
CO2 SERPL-SCNC: 21 MMOL/L (ref 21–32)
CREAT SERPL-MCNC: 1.15 MG/DL (ref 0.5–1.05)
GFR SERPL CREATININE-BSD FRML MDRD: 50 ML/MIN/1.73M*2
GLUCOSE SERPL-MCNC: 109 MG/DL (ref 74–99)
POTASSIUM SERPL-SCNC: 4.5 MMOL/L (ref 3.5–5.3)
PROT SERPL-MCNC: 7 G/DL (ref 6.4–8.2)
SODIUM SERPL-SCNC: 140 MMOL/L (ref 136–145)

## 2023-12-14 PROCEDURE — 86334 IMMUNOFIX E-PHORESIS SERUM: CPT

## 2023-12-14 PROCEDURE — 3078F DIAST BP <80 MM HG: CPT | Performed by: INTERNAL MEDICINE

## 2023-12-14 PROCEDURE — 82784 ASSAY IGA/IGD/IGG/IGM EACH: CPT

## 2023-12-14 PROCEDURE — 84165 PROTEIN E-PHORESIS SERUM: CPT

## 2023-12-14 PROCEDURE — 83521 IG LIGHT CHAINS FREE EACH: CPT

## 2023-12-14 PROCEDURE — 99214 OFFICE O/P EST MOD 30 MIN: CPT | Performed by: INTERNAL MEDICINE

## 2023-12-14 PROCEDURE — 80053 COMPREHEN METABOLIC PANEL: CPT

## 2023-12-14 PROCEDURE — 86320 SERUM IMMUNOELECTROPHORESIS: CPT | Performed by: INTERNAL MEDICINE

## 2023-12-14 PROCEDURE — 1159F MED LIST DOCD IN RCRD: CPT | Performed by: INTERNAL MEDICINE

## 2023-12-14 PROCEDURE — 36415 COLL VENOUS BLD VENIPUNCTURE: CPT

## 2023-12-14 PROCEDURE — 1126F AMNT PAIN NOTED NONE PRSNT: CPT | Performed by: INTERNAL MEDICINE

## 2023-12-14 PROCEDURE — 3077F SYST BP >= 140 MM HG: CPT | Performed by: INTERNAL MEDICINE

## 2023-12-14 PROCEDURE — 1160F RVW MEDS BY RX/DR IN RCRD: CPT | Performed by: INTERNAL MEDICINE

## 2023-12-14 PROCEDURE — 84155 ASSAY OF PROTEIN SERUM: CPT

## 2023-12-14 PROCEDURE — 84165 PROTEIN E-PHORESIS SERUM: CPT | Performed by: INTERNAL MEDICINE

## 2023-12-14 PROCEDURE — 1036F TOBACCO NON-USER: CPT | Performed by: INTERNAL MEDICINE

## 2023-12-14 ASSESSMENT — PAIN SCALES - GENERAL: PAINLEVEL: 0-NO PAIN

## 2023-12-14 NOTE — PROGRESS NOTES
Interval History:    Patient is a 73 -year-old white female previously seen by Dr. Chen and Dr. Dang, seen by me for the first time on 7/1/2021, with a history of stage I left breast invasive ductal carcinoma ER/IN positive and HER-2/vijay negative status post lumpectomy 9/2012, adjuvant  radiation 11/7/2012, and 5 years of adjuvant aromatase inhibitor (took Arimidex for 9 months then transitioned to letrozole given worsening joint aches) completed approximately January 2018.       8/22/2023 patient came back to see me earlier than previously scheduled because June 2023 she was found to have a low level monoclonal gammopathy IgG kappa 0.2 which was stable on follow-up July 2023 and so far appears to be MGUS, ordered by her PCP according  to patient because podiatry did some labs, sounds like may be for neuropathy evaluation.  Patient was also found to have a positive AUDREY June 2023 and had an appointment with Dr. Haynes November 2023, noted positive AUDREY, joint pain, lymphedema, MGUS, labs ordered but felt she had osteoarthritis rather than inflammatory arthritis, and her panel was overall unremarkable except for the positive AUDREY, has not heard from that provider yet but believes that follow-up is as needed.  Patient saw PCP 8/10/2023 recommending follow-up  with oncology, follow-up with rheumatology, carpal tunnel syndrome for which patient declined surgery; patient saw PCP 9/25/2023 with neck rash felt to be a contact dermatitis status post Lotrisone cream, gave doxycycline for a lower extremity cellulitis/phlebitis, recommended Farxiga; patient saw the nurse practitioner working with PCP 11/3/2023 with lower extremity cellulitis status post doxycycline for 7 days; patient saw PCP 11/14/2023 noting that patient wanted to wean off the Topamax but later a phone call that she had issues coming off of it and remained on it, Farxiga too expensive so discontinued and diet recommended, cellulitis/phlebitis better.  Her  "skin is currently doing okay for her, does have chronic stasis dermatitis.  PET scan 8/30/2023 done for the MGUS and history of lung nodules was unremarkable except for hypermetabolic lower extremities possible cellulitis, referred patient to PCP.  7/2023 PCP noted hemoglobin A1c was 5.9 when checked by podiatry with metformin recommended but patient decided not to take  this because she had insomnia when she took it, still does not check her glucose.     Patient had mild abnormalities on the CBC in 2021 which improved on follow-up, requested that she see me more often but she stated in 2022 that she would only see me once a year after her mammogram, otherwise would get blood work done by her PCP.    Hypothyroidism  is managed by PCP.  Patient has been chronic leg lymphedema  previously seen by the lymphedema clinic, currently controlled with  compression stockings .  Patient had chronic back and hip  pain with associated neuropathy and neurogenic claudication, status post L2-L5 decompressive laminectomy secondary to severe spinal stenosis 6/4/2021,   also history of epidural injections-pain stable.  She uses a cane when going outside and a walker at home \"preventatively\".  Pain is managed by PCP with topiramate  added June 2022, Tylenol helping the most, and October 2022 patient weaned down on gabapentin to 1/day, no longer follows with pain management.  Patient has a history of lung nodules, seen  by Dr. Abreu 5/2020 noting stable imaging with follow-up recommended with him in 12 months, noted prior breast cancer with adjuvant radiation-7/2021 she declined further follow-up with CT  surgery and declined further CT scans of the chest as she felt that her findings were stable; 8/22/2023 she is now agreeable but does not want to go anywhere except here.  She has been on oral B12 for several years, which she takes about once a week,  never on injections, recommended by her PCP.  She lives by herself and is " able to do her chores.  Bilateral mammogram 12/11/2023 showed calcifications felt to be dystrophic, no evidence of malignancy recommending follow-up in 1 year.       She received the flu vaccination 9/2023.   3/2021 she got her second Covid vaccination and a booster 10/2021, most  recent booster on 10/2022.  12/14/2023 I recommended follow-up with PCP regarding vaccine management.  No other hospitalization, major illness, or procedure since her last visit here on 8/22/2023.  I have reviewed the available laboratory data, radiographic data, medications, and notes, and have summarized them in this note on 12/14/2023 .     No fevers, hot flashes, unintentional weight loss, headaches, sore throat, acute vision changes, chest pain, change  in chronic leg edema, shortness breath, cough unless she has postnasal drip, nausea or vomiting, abnormal bowel movements, abdominal pain, blood in stool, dysuria or hematuria, pain or neurologic symptoms except as above, rashes or lumps now except chronic stasis dermatitis, abnormal bruising or bleeding, diabetes, anxiety  or depression, breast masses, vaginal discharge or bleeding.    Epic transition occurred 10/2/2023 from prior EMR system.  I did not go back in the epic system prior to this point unless patient brought up an issue.  I did review the EMR data in epic from 10/2/2023 going forward, but relied on our old EMR system for data prior to the transition.     Hematology-oncology history (reviewed and updated 12/14/2023)   -6/2012 left breast imaging showed a 6 mm irregular nodule upper inner quadrant left breast  -7/18/2012 left breast biopsy showed invasive ductal carcinoma grade 2 and DCIS intermediate grade, %, AR 95%, HER-2/vijay negative (1+)  -9/11/2012 patient underwent left lumpectomy and sentinel lymph node biopsy by Dr. Bryan.  Pathology showed 2 sentinel lymph nodes negative and left breast with invasive ductal carcinoma measuring 0.5 x 0.4 x 0.4 cm, grade 1  with no lymphatic vessel invasion  and DCIS intermediate grade, negative margins; [id number]  -9/2012 breast tumor board recommended postoperative radiation and hormonal therapy but no Oncotype testing.  -8/2018 bone density scan was normal.  -6/2020: WBC 8.5, hemoglobin 13.5, MCV 96, platelet 253.  Normal CMP except glucose 113.  -9/2020: WBC 10.3, hemoglobin 14, MCV 94, platelet 241.  Creatinine 0.8, calcium 9.6, normal sodium and potassium.  -November 2020: Normal T4 and TSH.  -5/2021: WBC 8.3, hemoglobin 12.6, MCV 94, platelets 268.  Creatinine 0.75.  Calcium 9.5  -6/2021: WBC 4.2, hemoglobin 9.5-10.3, MCV 95-99, platelet 200s.  Creatinine 1.12-0.7.  Calcium 7.3-7.9.  Negative  urine analysis.     -I initially saw the patient on 7/1/2021, previously seen by Dr. Chen and Dr. Dang; she canceled her appointment with me on 3/1/2021 because she was having back issues, details outlined above.  -7/2021: WBC 8.2-9.8, hemoglobin 1.2-11.9, MCV 94-96, platelet 114 and on repeat with 255, elevated neutrophils.  Normal sodium, potassium 4.2, creatinine 0.75, calcium 9.4.  Normal LFTs.  Normal TSH.  Normal vitamin D at 33.  Triglycerides elevated 226.   B12 is 197.  Normal TSH.   -3/31/2022: Normal CBC with WBC 9.7, hemoglobin 13.6, MCV 95, platelet 253, elevated neutrophils.  Reticulocyte count 2.2% not significant.  Normal LDH.  Iron 19% with ferritin 78.  Folate 23   -8/1/2022: Normal CBC with WBC 8.4, hemoglobin 13.3, MCV 96, platelet 230, normal differential number.  Normal sodium, potassium 4.4, creatinine 0.8, calcium 9.3, normal LFTs.  Iron 22% with ferritin 98.  Vitamin D was low at 28 with supplement given.  -6/6/2023: Normal CBC with WBC 7.3, hemoglobin 12.6, MCV 96, platelet 246.  Normal sodium, potassium 3.5, creatinine 1.0, calcium 9.3.  Normal LFTs, ESR, TSH, heavy metal screen.  B12 = 575.  Elevated  folate.  AUDREY was 1: 320.  IgG kappa 0.2.  Negative syphilis.   -7/11/2023: IgG kappa  0.2.  -8/22/2023-8/25/2023: Normal CBC with WBC 8.2, hemoglobin 12.3, MCV 96, platelet 221, normal differential number.  Normal LDH.  Normal sodium, potassium 4.3, creatinine 1.07, calcium 9.0.  Normal LFTs.  Spot and 24-hour UPEP negative.  IgG kappa 0.1.  Negative hepatitis.  Normal immunoglobulins.  Kappa light chain elevated 3.88 with normal lambda and ratio.  -PET scan 8/30/2023 done for the MGUS and history of lung nodules was unremarkable except for hypermetabolic lower extremities possible cellulitis  -11/14/2023: WBC 7.8, hemoglobin 13.2, MCV 99, platelet 254, normal differential number.  AUDREY +1: 320.  Normal/negative thyroid peroxidase antibody, lupus anticoagulant, anticardiolipin antibodies, beta-2 glycoprotein antibodies, NEPTALI panel, C3, C4, rheumatoid factor, CCP.     Past medical history  -Breast cancer as above   -6/2023 monoclonal gammopathy IgG kappa 0.2   -June 2023 AUDREY +1: 320   -Carpal tunnel syndrome   -Glucose intolerance with hemoglobin A1c 5.9 in July 2023 with metformin prescribed but patient stopped due to insomnia  -Hypertension  -Positive Cologuard with colonoscopy 9/30/2021 by Dr. Bryan showing a rectosigmoid polyp which was a hyperplastic polyp, no further endoscopy recommended given her advanced age  -Dyslipidemia   -Vitamin D deficiency  -Pulmonary nodules noted 4/2015.  8/2017 PET scan showed mild diffuse hypermetabolic activity throughout the tissue and muscular structures, groundglass nodular density on CT not evidence of but noted small size, mild activity  right adrenal.  5/2020 CT chest showed stable interstitial changes left anterior upper lobe could be from previous radiation, stable 1.1 cm left apex groundglass opacity, 4 mm faint groundglass opacity right upper lobe stable, emphysematous changes,  atelectasis lung bases, no new suspicious nodules, stable pleural thickening and scarring right lower lobe, probable right adrenal adenoma, stable sclerotic focus lower sternal  body; overall no significant change with stable upper lobe groundglass opacities.   -Restless leg syndrome  -B12: At her appointment on 7/1/2021 patient was taking oral B12 weekly, never injections, started a few years prior at the recommendation of her PCP   -Lumbar/cervical radiculitis with a history of epidural injections, feet neuropathy, neurogenic claudication, osteoarthritis, sciatica, spinal stenosis-status post back surgery   -Trigger finger mentioned in the chart but patient denied on 7/1/2021  -Hypothyroidism, started on medication 11/2020  -Lymphedema lower extremities  -Carotid disease  -Abnormal  LFTs with transaminases up to the 70s in 2012 and 60s in 2015 but subsequently normal as of 6/2020  -Cellulitis of the lower extremities   -Venous insufficiency, stasis dermatitis  -Fatty liver on imaging 2015  -Positive stool occult with previous colon polyps (hyperplastic polyp 2005) and colonoscopy 7/2008 with small internal hemorrhoids and diverticula otherwise unremarkable.  -Blood loss anemia with hemoglobin down to  9.5 6/2021 after back surgery  -6/2021 apnea during back surgery with sleep apnea evaluation recommended but patient declined 6/2021   -Former smoker  -glaucoma and dry eyes  -Lower extremity cellulitis/Vituz treated with doxycycline by PCP September 2023 and November 2023  -Rheumatology evaluation in 11/2023 as above     Past surgical history  Breast as above, D&C, bladder sling, tubal ligation, appendectomy, left total hip 4/2015,  laser eye surgery, cataract surgery, colonoscopies as above, L2-L5 decompressive laminectomy on 6/4/2021, laser eye surgery     Social history  No alcohol or drugs.  Smoked for 30 years, 1.5-2  packs/day, quit at age 46.     Family history  No blood disorders or cancers in first-degree relatives.     Allergies   Allergen Reactions    Milk Diarrhea     Current Outpatient Medications on File Prior to Visit   Medication Sig Dispense Refill    acetaminophen  (Tylenol) 325 mg tablet Take 1 tablet (325 mg) by mouth every 6 hours if needed.      azilsartan-chlorthalidone (Edarbyclor) 40-25 mg tablet tablet Take 1 tablet by mouth once daily.      brimonidine (AlphaGAN P) 0.2 % ophthalmic solution       carvedilol (Coreg) 25 mg tablet TAKE 1 TABLET DAILY 90 tablet 0    cholecalciferol (Vitamin D-3) 125 MCG (5000 UT) capsule Take 1 capsule (125 mcg) by mouth once daily. 90 capsule 1    cyanocobalamin (Vitamin B-12) 1,000 mcg tablet Take 1 tablet (1,000 mcg) by mouth once daily.      dorzolamide-timoloL (Cosopt) 22.3-6.8 mg/mL ophthalmic solution Administer into affected eye(s) twice a day.      gabapentin (Neurontin) 300 mg capsule Take 1 capsule (300 mg) by mouth once daily. 90 capsule 0    latanoprost (Xalatan) 0.005 % ophthalmic solution Administer into affected eye(s).      levothyroxine (Synthroid, Levoxyl) 50 mcg tablet Take 1 tablet (50 mcg) by mouth once daily. 90 tablet 0    omega 3-dha-epa-fish oil (Fish OiL) 1,000 mg (120 mg-180 mg) capsule Take 1 capsule (1,000 mg) by mouth once daily.      pramipexole (Mirapex) 1.5 mg tablet Take 0.5 tablets (0.75 mg) by mouth once daily at bedtime. 45 tablet 0    rosuvastatin (Crestor) 10 mg tablet TAKE 1 TABLET ONCE DAILY 90 tablet 0    topiramate (Topamax) 25 mg tablet Take 1 tablet (25 mg) by mouth 2 times a day for 14 days. 28 tablet 0    topiramate (Topamax) 25 mg tablet Take 1 tablet (25 mg) by mouth 2 times a day for 14 days. 28 tablet 0    [DISCONTINUED] vitamin E 450 mg (1000 unit) capsule Take 5 capsules (5,000 Units) by mouth once daily.       No current facility-administered medications on file prior to visit.     Vitals:    12/14/23 1236   BP: 147/71   Pulse: 56   Resp: 18   Temp: 36.4 °C (97.5 °F)   SpO2: 95%           Physical Exam:      Constitutional: Performance status 1. 100  kg.  -General: Well-developed and well-nourished. No acute distress.  Elevated BMI noted.  -Lymphatics: No cervical or supraclavicular  "or axillary lymphadenopathy.  -Eyes: Wears glasses.  Anicteric.  -HEENT: MMM.  Neck supple.  -Breast: Patient declined exam 12/14/2023 but on 12/15/2022 was as follows: \"Need for chaperone discussed with patient in the physical exam space-patient consented.  Chaperone and other persons present for physical exam included the following: RN June  Bilateral breasts with no masses.  Postsurgical changes present.\"     -Cardiovascular: RRR    -Respiratory: Clear to auscultation bilaterally. Breathing is nonlabored.  -Abdomen: Soft, nontender, limited by body habitus and patient positioning.  -Back: No costovertebral angle tenderness.   -Extremities: 1+ bilateral leg edema with compression stockings in place-stable.  -Neurologic: Awake, alert, and oriented.  Uses a cane.   Speech fluent with normal content.   -Skin: Butterfly tattoo on chest not examined today.  -Psychiatric: Cooperative.            Assessment and Plan:   Assessment:    #Left breast  invasive ductal carcinoma grade 1 on final pathology, ER/ID positive, HER-2/vijay negative, also intermediate grade DCIS, 0.5 cm, lymph nodes negative, [id number], status post lumpectomy 9/2012, adjuvant radiation completed 11/2012, and 5 years of aromatase  inhibitor (tamoxifen for 9 months transition to letrozole given joint aches) completed around January 2018.    No evidence of disease although patient declined breast exam.  Negative hepatitis panel 8/2023.     #IgG kappa monoclonal gammopathy low level 0.2 in June 2023 confirmed July 2023 with no hypercalcemia, anemia, renal failure, bony lesions related to this, 8/30/2023 PET scan unremarkable except for hypermetabolism lower extremities felt to be cellulitis and she was treated for cellulitis by PCP with symptoms improved, 8/2023 normal LDH and negative spot and 24-hour UPEP with mildly elevated kappa light chain but normal ratio and lambda with IgG kappa 0.1 and normal immunoglobulins, so far appears to be MGUS, could be " related to an undiagnosed rheumatologic condition given  her positive AUDREY June 2023 although seen by rheumatology 11/2023 and other panel was negative felt not to have an inflammatory arthritis.  Monoclonal gammopathy was found in the setting of chronic pain with neuropathy which does not appear related to her monoclonal gammopathy but rather her back issues and possibly due to diabetes.     #July 2023 hemoglobin A1c 5.9 and patient was started on metformin from PCP for diabetes but she had insomnia so she stopped this, attempted to get patient on Farxiga 9/2023 but that was cost prohibitive, dietary recommendations given by PCP.     #Possible carpal tunnel syndrome July 2023 for which patient declined surgery     #Hypertension      #History of colon polyps with colonoscopy 2008 recommending follow-up in 5 years.  7/1/2021 patient declined further colonoscopies but a Cologuard from PCP prompting a colonoscopy 9/30/2021 by Dr. Bryan showing a hyperplastic rectosigmoid polyp; he recommended no further follow-up given her advanced age.     # Pulmonary nodules noted 4/2015.  8/2017 PET scan showed mild diffuse hypermetabolic activity throughout the tissue and  muscular structures, groundglass nodular density on CT not evident but noted small size, mild activity right adrenal.  5/2020  CT chest showed stable interstitial changes left anterior upper lobe could be from previous radiation, stable 1.1 cm left apex groundglass opacity, 4 mm faint groundglass opacity right upper lobe stable, emphysematous changes, atelectasis lung bases,  no new suspicious nodules, stable pleural thickening and scarring right lower lobe, probable right adrenal adenoma, stable sclerotic focus lower sternal body; overall no significant change with stable upper lobe groundglass opacities. Pt was seen by CT  surgery 5/2020 most recently recommending follow-up in 1 year with a CT. 7/1/2021 patient declined further CT surgery follow-up as well as CT  scans of the chest as she felt that her findings were  stable-I did explain she could have a malignancy that might be missed.    7/2023 chest x-ray unremarkable.  PET scan showed no significant lung lesions 8/30/2023.     #Vitamin D deficiency, low 8/2022 status post supplement.  Patient declined further labs from me regarding this.       #Lymphedema of the lower extremities, controlled with compression stockings     #Fatty liver     #Back/hip pain with neurogenic claudication status post back surgery 6/2021 , symptoms not significantly better after surgery     #6/2021 postoperative anemia likely from blood loss, leukocytosis, renal insufficiency, hypocalcemia.  Labs better on follow-up.  Normal CBC November 2023.     #7/2021 mild anemia hemoglobin 11s might have been residual from her surgery as well as CKD, also thrombocytopenia at 114 but recheck to 255 so possible lab error.  3/2022 normal CBC, normal LDH, reticulocyte count 2.2%, iron 19% with ferritin 78 which  was rechecked in August 2022 and normal, normal folate March 2022.  8/2022 normal CBC, normal iron studies.  June 2023 normal CBC.  Normal CBC November 2023.     #Other chronic medical problems including dyslipidemia, restless leg syndrome, B12 deficiency although no labs to support since that I could see from [id number], hypothyroidism, carotid disease,  stasis dermatitis     #Apnea during back surgery 6/2021 with sleep apnea testing ordered by PCP but patient declined.     #Noncompliant with recommendations to follow-up with me regarding her labs and imaging, 8/22/2023 patient more agreeable given new diagnosis of MGUS, 12/14/2023 patient reluctant for additional labs for me and for closer monitoring but eventually agreeable.    # Lower extremity cellulitis treated by PCP September and November 2023 with symptoms subsequently resolved.    # November 2023 MCV 99 somewhat borderline but still within normal limits, 6/2023 normal B12 and folate, can be  monitored periodically with further evaluation depending on trend.     -I again explained her diagnosis of MGUS, at least a precancerous condition which can evolve to myeloma, so far does not appear to have multiple myeloma, does need to be monitored.    -I asked her to check with her rheumatologist regarding her lab panel November 2023 and further follow-up if needed.  -I recommended ongoing management of her lower extremity inflammation with PCP.  -CBC was done 11/2023 so deferred today but CMP, SPEP, light chains, immunoglobulins ordered today.  I advised ongoing follow-up with PCP for vitamin D and other labs. Bone marrow biopsy was discussed 8/22/2023 but patient refused understanding I am limited without this.  Other things to consider would be beta-2 microglobulin, uric acid.   - B12 monitoring could be considered although no real labs to suggest deficiency-normal June 2023.       -Mammogram 12/11/2023 recommended follow-up in 1 year.  Bone density monitoring and calcium and vitamin D were encouraged to be done via her gynecologist or PCP as she is no longer on systemic treatment  from us, especially as her DEXA scan was normal 8/2018-I reminded her of this 12/14/2023.  Self exams, family screening, healthy lifestyle  with diet and exercise with optimization of weight, limiting alcohol, annual mammogram or sooner as needed were encouraged.  I again recommended avoidance of  estrogen-based products.  -I again 12/14/2023 recommended follow-up with PCP and/or GI for her fatty liver with weight loss and low-fat diet recommended.    -I have advised her to follow-up with CT surgery regarding her pulmonary nodules , but she declined.  Further chest imaging is deferred to PCP, PET scan done as above.  - Labs are incorporated in my note which is to be sent to PCP. I have recommended routine health care maintenance and screening through PCP. The patient  was reminded to followup with the PCP for other medical problems  and ongoing care. The patient was asked to return to clinic, or sooner as needed for new or concerning symptoms, in 4 months.  Patient was reluctant to come back in 4 months and wanted to just come back annually but I explained given her multiple hematologic-oncologic issues I recommended sooner follow-up and eventually she was agreeable.    12/14/2023 I explained my upcoming departure from [facility], explained that I was told that [facility] is working on my replacement, notified patient to make sure appointments/orders are scheduled and kept as directed.  I wished the patient the best of luck.

## 2023-12-15 LAB
IGA SERPL-MCNC: 236 MG/DL (ref 70–400)
IGG SERPL-MCNC: 1030 MG/DL (ref 700–1600)
IGM SERPL-MCNC: 104 MG/DL (ref 40–230)
KAPPA LC SERPL-MCNC: 4.03 MG/DL (ref 0.33–1.94)
KAPPA LC/LAMBDA SER: 1.7 {RATIO} (ref 0.26–1.65)
LAMBDA LC SERPL-MCNC: 2.37 MG/DL (ref 0.57–2.63)
PROT SERPL-MCNC: 7.2 G/DL (ref 6.4–8.2)

## 2023-12-18 DIAGNOSIS — D47.2 MGUS (MONOCLONAL GAMMOPATHY OF UNKNOWN SIGNIFICANCE): Primary | ICD-10-CM

## 2023-12-18 LAB
ALBUMIN: 4.2 G/DL (ref 3.4–5)
ALPHA 1 GLOBULIN: 0.3 G/DL (ref 0.2–0.6)
ALPHA 2 GLOBULIN: 0.9 G/DL (ref 0.4–1.1)
BETA GLOBULIN: 0.8 G/DL (ref 0.5–1.2)
GAMMA GLOBULIN: 1 G/DL (ref 0.5–1.4)
IMMUNOFIXATION COMMENT: ABNORMAL
M-PROTEIN 1: 0.1 G/DL
PATH REVIEW - SERUM IMMUNOFIXATION: ABNORMAL
PATH REVIEW-SERUM PROTEIN ELECTROPHORESIS: ABNORMAL
PROTEIN ELECTROPHORESIS COMMENT: ABNORMAL

## 2023-12-21 ENCOUNTER — TELEPHONE (OUTPATIENT)
Dept: HEMATOLOGY/ONCOLOGY | Facility: HOSPITAL | Age: 73
End: 2023-12-21

## 2023-12-21 ENCOUNTER — OFFICE VISIT (OUTPATIENT)
Dept: PRIMARY CARE | Facility: CLINIC | Age: 73
End: 2023-12-21
Payer: MEDICARE

## 2023-12-21 VITALS
TEMPERATURE: 97.3 F | BODY MASS INDEX: 41.22 KG/M2 | WEIGHT: 224 LBS | SYSTOLIC BLOOD PRESSURE: 130 MMHG | DIASTOLIC BLOOD PRESSURE: 70 MMHG | HEART RATE: 70 BPM | OXYGEN SATURATION: 93 % | HEIGHT: 62 IN

## 2023-12-21 DIAGNOSIS — E78.5 DYSLIPIDEMIA: ICD-10-CM

## 2023-12-21 DIAGNOSIS — E78.00 HYPERCHOLESTEROLEMIA: ICD-10-CM

## 2023-12-21 DIAGNOSIS — N18.31 STAGE 3A CHRONIC KIDNEY DISEASE (MULTI): ICD-10-CM

## 2023-12-21 DIAGNOSIS — D47.2 MGUS (MONOCLONAL GAMMOPATHY OF UNKNOWN SIGNIFICANCE): ICD-10-CM

## 2023-12-21 DIAGNOSIS — Z13.89 SCREENING FOR MULTIPLE CONDITIONS: ICD-10-CM

## 2023-12-21 DIAGNOSIS — E13.9 DIABETES 1.5, MANAGED AS TYPE 2 (MULTI): ICD-10-CM

## 2023-12-21 DIAGNOSIS — I89.0 LYMPHEDEMA DUE TO CHRONIC INFLAMMATION: ICD-10-CM

## 2023-12-21 DIAGNOSIS — G62.9 NEUROPATHY: ICD-10-CM

## 2023-12-21 DIAGNOSIS — Z00.00 ROUTINE GENERAL MEDICAL EXAMINATION AT HEALTH CARE FACILITY: ICD-10-CM

## 2023-12-21 DIAGNOSIS — I10 BENIGN ESSENTIAL HYPERTENSION: Primary | ICD-10-CM

## 2023-12-21 PROCEDURE — 1170F FXNL STATUS ASSESSED: CPT | Performed by: INTERNAL MEDICINE

## 2023-12-21 PROCEDURE — 1126F AMNT PAIN NOTED NONE PRSNT: CPT | Performed by: INTERNAL MEDICINE

## 2023-12-21 PROCEDURE — 99214 OFFICE O/P EST MOD 30 MIN: CPT | Performed by: INTERNAL MEDICINE

## 2023-12-21 PROCEDURE — 3075F SYST BP GE 130 - 139MM HG: CPT | Performed by: INTERNAL MEDICINE

## 2023-12-21 PROCEDURE — 1160F RVW MEDS BY RX/DR IN RCRD: CPT | Performed by: INTERNAL MEDICINE

## 2023-12-21 PROCEDURE — G0439 PPPS, SUBSEQ VISIT: HCPCS | Performed by: INTERNAL MEDICINE

## 2023-12-21 PROCEDURE — 3078F DIAST BP <80 MM HG: CPT | Performed by: INTERNAL MEDICINE

## 2023-12-21 PROCEDURE — 1036F TOBACCO NON-USER: CPT | Performed by: INTERNAL MEDICINE

## 2023-12-21 PROCEDURE — 3044F HG A1C LEVEL LT 7.0%: CPT | Performed by: INTERNAL MEDICINE

## 2023-12-21 PROCEDURE — 1159F MED LIST DOCD IN RCRD: CPT | Performed by: INTERNAL MEDICINE

## 2023-12-21 RX ORDER — ROSUVASTATIN CALCIUM 10 MG/1
10 TABLET, COATED ORAL DAILY
Qty: 90 TABLET | Refills: 1 | Status: SHIPPED | OUTPATIENT
Start: 2023-12-21 | End: 2024-04-04 | Stop reason: SDUPTHER

## 2023-12-21 RX ORDER — CARVEDILOL 25 MG/1
25 TABLET ORAL DAILY
Qty: 90 TABLET | Refills: 1 | Status: SHIPPED | OUTPATIENT
Start: 2023-12-21 | End: 2024-04-17 | Stop reason: SDUPTHER

## 2023-12-21 RX ORDER — TOPIRAMATE 25 MG/1
25 TABLET ORAL 2 TIMES DAILY
Qty: 180 TABLET | Refills: 1 | Status: SHIPPED | OUTPATIENT
Start: 2023-12-21 | End: 2023-12-21

## 2023-12-21 RX ORDER — TOPIRAMATE 25 MG/1
25 TABLET ORAL 2 TIMES DAILY
Qty: 180 TABLET | Refills: 1 | Status: SHIPPED | OUTPATIENT
Start: 2023-12-21 | End: 2024-12-20

## 2023-12-21 ASSESSMENT — ACTIVITIES OF DAILY LIVING (ADL)
BATHING: INDEPENDENT
MANAGING_FINANCES: INDEPENDENT
TAKING_MEDICATION: INDEPENDENT
DRESSING: INDEPENDENT
GROCERY_SHOPPING: INDEPENDENT
DOING_HOUSEWORK: INDEPENDENT

## 2023-12-21 ASSESSMENT — ENCOUNTER SYMPTOMS
DIFFICULTY URINATING: 0
BLOOD IN STOOL: 0
SINUS PAIN: 0
LOSS OF SENSATION IN FEET: 0
SORE THROAT: 0
UNEXPECTED WEIGHT CHANGE: 0
DEPRESSION: 0
OCCASIONAL FEELINGS OF UNSTEADINESS: 1
DIZZINESS: 0
ARTHRALGIAS: 0
COUGH: 0
FEVER: 0
DIARRHEA: 0
PALPITATIONS: 0
HEADACHES: 0
WHEEZING: 0
ABDOMINAL PAIN: 0
FATIGUE: 0
BRUISES/BLEEDS EASILY: 0

## 2023-12-21 ASSESSMENT — PATIENT HEALTH QUESTIONNAIRE - PHQ9
SUM OF ALL RESPONSES TO PHQ9 QUESTIONS 1 AND 2: 0
2. FEELING DOWN, DEPRESSED OR HOPELESS: NOT AT ALL
1. LITTLE INTEREST OR PLEASURE IN DOING THINGS: NOT AT ALL

## 2023-12-21 NOTE — TELEPHONE ENCOUNTER
Reached out to patient regarding her need for a upcoming appointment with Dr. Parada, let patient know we can transfer her care to [facility], until a new provider is in place. Patient was agreeable and states her and Dr. Parada spoke at her last appointment stating she didn't need to be see for 3 months. Scheduled patient for Monday April 8th, 2024 @ 11:00 am. Patient verbalized and agreed to appointment.

## 2023-12-21 NOTE — PROGRESS NOTES
"Subjective   Reason for Visit: Ashli Justice is an 73 y.o. female here for a Medicare Wellness visit.     Past Medical, Surgical, and Family History reviewed and updated in chart.    Reviewed all medications by prescribing practitioner or clinical pharmacist (such as prescriptions, OTCs, herbal therapies and supplements) and documented in the medical record.    Annual Medicare physical  -Recent blood work reviewed up-to-date  Needs thyroid function test and lipids in June 2024  - Screening mammogram obtained repeat in December 2024  - Screening for colon cancer not needed  Screening for depression negative  Medical screening reviewed with patient    Follow-up  -- Chronic bilateral lower extremity lymphedema  Patient comes about daily dressing with Aloe Vesta 3 continue with compression hose keep the legs elevated, doing much better  -History of lumbosacral surgery and chronic neuropathy  Patient to continue gabapentin 300 mg at bedtime  topiramate 25 mg 1  tablets twice a day patient   -Diabetes and leg swelling patient did not tolerate metformin due to diarrhea now on Farxiga very expensive patient will discontinue at this time continue with diet controlled reevaluate patient in 3 months arrange with hemoglobin A1c next appointment  - Recurrent phlebitis and superficial cellulitis now controlled to continue with daily dressing  - Patient seen by oncology underlying MUGUS follow-up closely  - Seen by rheumatology awaiting further work-up unlikely to have autoimmune arthritis  -History of breast cancer remission follow-up hematology oncology in December as recommended  -- Bilateral carpal tunnel syndrome treated conservatively declined surgical intervention at this time  - Underlying sleep apnea continue with fatigue and tiredness and weight gain declined CPAP declined evaluation by sleep clinic  Morbid obesity  -\" Obesity patient now BMI 39 lost about 10 pounds since last time continue topiramate 50 mg twice a day " "continue on 1000-calorie reduction  -Neuropathy continues gabapentin 300 mg in p.m.  -Hypertension, improving, counseled about weight loss  -Hypothyroid controlled  -Lymphedema continue with continuous wrapping per lymphedema clinic  -Lumbosacral disease cut down gabapentin take it only at bedtime  -Carotid atherosclerosis ultrasound not changed less than 50% no TIA continue on aspirin marie months      Med Refill  Pertinent negatives include no abdominal pain, arthralgias, chest pain, congestion, coughing, fatigue, fever, headaches, rash or sore throat.       Patient Care Team:  Sagrario Rachel MD as PCP - General  Sagrario Rachel MD as PCP - [facility] Attributed Provider  Rachel Parada MD as Consulting Physician (Hematology and Oncology)     Review of Systems   Constitutional:  Negative for fatigue, fever and unexpected weight change.   HENT:  Negative for congestion, ear discharge, ear pain, mouth sores, sinus pain and sore throat.    Eyes:  Negative for visual disturbance.   Respiratory:  Negative for cough and wheezing.    Cardiovascular:  Negative for chest pain, palpitations and leg swelling.   Gastrointestinal:  Negative for abdominal pain, blood in stool and diarrhea.   Genitourinary:  Negative for difficulty urinating.   Musculoskeletal:  Negative for arthralgias.   Skin:  Negative for rash.   Neurological:  Negative for dizziness and headaches.   Hematological:  Does not bruise/bleed easily.   Psychiatric/Behavioral:  Negative for behavioral problems.    All other systems reviewed and are negative.      Objective   Vitals:  /70   Pulse 70   Temp 36.3 °C (97.3 °F)   Ht 1.575 m (5' 2\")   Wt 102 kg (224 lb)   SpO2 93%   BMI 40.97 kg/m²     Lab Results   Component Value Date    WBC 7.8 11/14/2023    HGB 13.2 11/14/2023    HCT 43.8 11/14/2023     11/14/2023    CHOL 126 07/12/2021    TRIG 226 (H) 07/12/2021    HDL 40.0 07/12/2021    ALT 23 12/14/2023    AST 21 12/14/2023     " 12/14/2023    K 4.5 12/14/2023     (H) 12/14/2023    CREATININE 1.15 (H) 12/14/2023    BUN 44 (H) 12/14/2023    CO2 21 12/14/2023    TSH 3.87 06/06/2023    INR 1.1 05/19/2021    HGBA1C 5.9 (A) 06/06/2023     par   Physical Exam  Vitals and nursing note reviewed.   Constitutional:       Appearance: Normal appearance.   HENT:      Head: Normocephalic.      Nose: Nose normal.   Eyes:      Conjunctiva/sclera: Conjunctivae normal.      Pupils: Pupils are equal, round, and reactive to light.   Cardiovascular:      Rate and Rhythm: Regular rhythm.   Pulmonary:      Effort: Pulmonary effort is normal.      Breath sounds: Normal breath sounds.   Abdominal:      General: Abdomen is flat.      Palpations: Abdomen is soft.   Musculoskeletal:      Cervical back: Neck supple.   Skin:     General: Skin is warm.   Neurological:      General: No focal deficit present.      Mental Status: She is oriented to person, place, and time.   Psychiatric:         Mood and Affect: Mood normal.         Assessment/Plan   Problem List Items Addressed This Visit       Benign essential hypertension - Primary    Relevant Medications    carvedilol (Coreg) 25 mg tablet    Dyslipidemia    Hypercholesterolemia    Relevant Medications    rosuvastatin (Crestor) 10 mg tablet    Lymphedema due to chronic inflammation    Neuropathy    Relevant Medications    topiramate (Topamax) 25 mg tablet    Stage 3a chronic kidney disease (CMS/HCC)     Other Visit Diagnoses       Screening for multiple conditions        Routine general medical examination at health care facility        MGUS (monoclonal gammopathy of unknown significance)        Diabetes 1.5, managed as type 2 (CMS/HCC)              Annual Medicare physical  -Recent blood work reviewed up-to-date  Needs thyroid function test and lipids in June 2024  - Screening mammogram obtained repeat in December 2024  - Screening for colon cancer not needed  Screening for depression negative  Medical screening  "reviewed with patient    Follow-up  -- Chronic bilateral lower extremity lymphedema  Patient comes about daily dressing with Aloe Vesta 3 continue with compression hose keep the legs elevated, doing much better  -History of lumbosacral surgery and chronic neuropathy  Patient to continue gabapentin 300 mg at bedtime  topiramate 25 mg 1  tablets twice a day patient   -Diabetes and leg swelling patient did not tolerate metformin due to diarrhea now on Farxiga very expensive patient will discontinue at this time continue with diet controlled reevaluate patient in 3 months arrange with hemoglobin A1c next appointment  - Recurrent phlebitis and superficial cellulitis now controlled to continue with daily dressing  - Patient seen by oncology underlying MUGUS follow-up closely  - Seen by rheumatology awaiting further work-up unlikely to have autoimmune arthritis  -History of breast cancer remission follow-up hematology oncology in December as recommended  -- Bilateral carpal tunnel syndrome treated conservatively declined surgical intervention at this time  - Underlying sleep apnea continue with fatigue and tiredness and weight gain declined CPAP declined evaluation by sleep clinic  Morbid obesity  -\" Obesity patient now BMI 39 lost about 10 pounds since last time continue topiramate 50 mg twice a day continue on 1000-calorie reduction  -Neuropathy continues gabapentin 300 mg in p.m.  -Hypertension, improving, counseled about weight loss  -Hypothyroid controlled  -Lymphedema continue with continuous wrapping per lymphedema clinic  -Lumbosacral disease cut down gabapentin take it only at bedtime  -Carotid atherosclerosis ultrasound not changed less than 50% no TIA continue on aspirin marie months       "

## 2023-12-27 ENCOUNTER — TELEPHONE (OUTPATIENT)
Dept: HEMATOLOGY/ONCOLOGY | Facility: HOSPITAL | Age: 73
End: 2023-12-27
Payer: MEDICARE

## 2023-12-27 NOTE — TELEPHONE ENCOUNTER
Slight increase in light chains, follow-up in 1 month with the new provider, order placed. Per Dr. Parada staff message.         Spoke with patient, explained why Dr. Parada wanted her to be seen sooner. Patient declined to reschedule at this time, wants to keep her three month appointment with Beena Lacey CNP.

## 2024-02-07 DIAGNOSIS — E03.9 HYPOTHYROIDISM, UNSPECIFIED TYPE: ICD-10-CM

## 2024-02-07 RX ORDER — LEVOTHYROXINE SODIUM 50 UG/1
50 TABLET ORAL DAILY
Qty: 90 TABLET | Refills: 0 | Status: SHIPPED | OUTPATIENT
Start: 2024-02-07 | End: 2024-02-16 | Stop reason: SDUPTHER

## 2024-02-16 DIAGNOSIS — E03.9 HYPOTHYROIDISM, UNSPECIFIED TYPE: ICD-10-CM

## 2024-02-16 DIAGNOSIS — M51.36 LUMBAR DEGENERATIVE DISC DISEASE: ICD-10-CM

## 2024-02-16 DIAGNOSIS — G25.81 RESTLESS LEG SYNDROME: ICD-10-CM

## 2024-02-17 RX ORDER — GABAPENTIN 300 MG/1
300 CAPSULE ORAL DAILY
Qty: 90 CAPSULE | Refills: 0 | Status: SHIPPED | OUTPATIENT
Start: 2024-02-17 | End: 2024-04-22 | Stop reason: SDUPTHER

## 2024-02-17 RX ORDER — LEVOTHYROXINE SODIUM 50 UG/1
50 TABLET ORAL DAILY
Qty: 90 TABLET | Refills: 0 | Status: SHIPPED | OUTPATIENT
Start: 2024-02-17

## 2024-02-17 RX ORDER — PRAMIPEXOLE DIHYDROCHLORIDE 1.5 MG/1
0.75 TABLET ORAL NIGHTLY
Qty: 45 TABLET | Refills: 0 | Status: SHIPPED | OUTPATIENT
Start: 2024-02-17 | End: 2024-05-07

## 2024-03-28 DIAGNOSIS — I10 HYPERTENSION, UNSPECIFIED TYPE: ICD-10-CM

## 2024-03-28 RX ORDER — AZILSARTAN KAMEDOXOMIL AND CHLORTHALIDONE 40; 25 MG/1; MG/1
1 TABLET ORAL DAILY
Qty: 90 TABLET | Refills: 3 | Status: SHIPPED | OUTPATIENT
Start: 2024-03-28

## 2024-04-04 ENCOUNTER — OFFICE VISIT (OUTPATIENT)
Dept: PRIMARY CARE | Facility: CLINIC | Age: 74
End: 2024-04-04
Payer: MEDICARE

## 2024-04-04 VITALS
OXYGEN SATURATION: 91 % | TEMPERATURE: 97.1 F | SYSTOLIC BLOOD PRESSURE: 130 MMHG | BODY MASS INDEX: 41.15 KG/M2 | WEIGHT: 225 LBS | DIASTOLIC BLOOD PRESSURE: 60 MMHG | HEART RATE: 58 BPM

## 2024-04-04 DIAGNOSIS — E13.9 DIABETES 1.5, MANAGED AS TYPE 2 (MULTI): ICD-10-CM

## 2024-04-04 DIAGNOSIS — L97.511 NON-PRESSURE CHRONIC ULCER OF OTHER PART OF RIGHT FOOT LIMITED TO BREAKDOWN OF SKIN (MULTI): ICD-10-CM

## 2024-04-04 DIAGNOSIS — M51.36 LUMBAR DEGENERATIVE DISC DISEASE: ICD-10-CM

## 2024-04-04 DIAGNOSIS — I73.9 PERIPHERAL VASCULAR DISEASE, UNSPECIFIED (CMS-HCC): Primary | ICD-10-CM

## 2024-04-04 DIAGNOSIS — Z17.0 MALIGNANT NEOPLASM OF LEFT BREAST IN FEMALE, ESTROGEN RECEPTOR POSITIVE, UNSPECIFIED SITE OF BREAST (MULTI): ICD-10-CM

## 2024-04-04 DIAGNOSIS — E78.00 HYPERCHOLESTEROLEMIA: ICD-10-CM

## 2024-04-04 DIAGNOSIS — N18.31 STAGE 3A CHRONIC KIDNEY DISEASE (MULTI): ICD-10-CM

## 2024-04-04 DIAGNOSIS — R73.03 PREDIABETES: ICD-10-CM

## 2024-04-04 DIAGNOSIS — C50.912 MALIGNANT NEOPLASM OF LEFT BREAST IN FEMALE, ESTROGEN RECEPTOR POSITIVE, UNSPECIFIED SITE OF BREAST (MULTI): ICD-10-CM

## 2024-04-04 DIAGNOSIS — I89.0 LYMPHEDEMA DUE TO CHRONIC INFLAMMATION: ICD-10-CM

## 2024-04-04 PROCEDURE — 1036F TOBACCO NON-USER: CPT | Performed by: INTERNAL MEDICINE

## 2024-04-04 PROCEDURE — 3075F SYST BP GE 130 - 139MM HG: CPT | Performed by: INTERNAL MEDICINE

## 2024-04-04 PROCEDURE — 1157F ADVNC CARE PLAN IN RCRD: CPT | Performed by: INTERNAL MEDICINE

## 2024-04-04 PROCEDURE — 1160F RVW MEDS BY RX/DR IN RCRD: CPT | Performed by: INTERNAL MEDICINE

## 2024-04-04 PROCEDURE — 99214 OFFICE O/P EST MOD 30 MIN: CPT | Performed by: INTERNAL MEDICINE

## 2024-04-04 PROCEDURE — 1159F MED LIST DOCD IN RCRD: CPT | Performed by: INTERNAL MEDICINE

## 2024-04-04 PROCEDURE — 3078F DIAST BP <80 MM HG: CPT | Performed by: INTERNAL MEDICINE

## 2024-04-04 RX ORDER — ROSUVASTATIN CALCIUM 10 MG/1
10 TABLET, COATED ORAL DAILY
Qty: 90 TABLET | Refills: 1 | Status: SHIPPED | OUTPATIENT
Start: 2024-04-04 | End: 2024-04-16 | Stop reason: SDUPTHER

## 2024-04-04 ASSESSMENT — ENCOUNTER SYMPTOMS
FEVER: 0
DIARRHEA: 0
BLOOD IN STOOL: 0
DIFFICULTY URINATING: 0
BRUISES/BLEEDS EASILY: 0
WHEEZING: 0
ARTHRALGIAS: 0
HEADACHES: 0
COUGH: 0
ABDOMINAL PAIN: 0
SORE THROAT: 0
SINUS PAIN: 0
HYPERTENSION: 1
FATIGUE: 0
DIZZINESS: 0
UNEXPECTED WEIGHT CHANGE: 0
PALPITATIONS: 0

## 2024-04-04 NOTE — PROGRESS NOTES
"Subjective   Patient ID: Ashli Justice is a 73 y.o. female who presents for Hypertension and Hyperlipidemia.    -- Chronic bilateral lower extremity lymphedema  Patient comes about daily dressing with Aloe Vesta 3 continue with compression hose keep the legs elevated, doing much better  -History of lumbosacral surgery and chronic neuropathy  Patient to continue gabapentin 300 mg at bedtime  topiramate 25 mg 1  tablets twice a day patient   -Diabetes and leg swelling patient did not tolerate metformin due to diarrhea now on Farxiga very expensive patient will discontinue at this time continue with diet controlled reevaluate patient in 3 months arrange with hemoglobin A1c next appointment  - Recurrent phlebitis and superficial cellulitis now controlled to continue with   - Patient seen by oncology underlying MUGUS follow-up closely  -History of breast cancer remission follow-up hematology oncology, no recurrence  -- Bilateral carpal tunnel syndrome treated conservatively declined surgical intervention at this time  - Underlying sleep apnea continue with fatigue and tiredness and weight gain declined CPAP declined evaluation by sleep clinic  Morbid obesity  -\" Obesity patient now BMI 39 lost about 10 pounds since last time continue topiramate 50 mg twice a day continue on 1000-calorie reduction  -Neuropathy continues gabapentin 300 mg in p.m.  -Hypertension, improving, counseled about weight loss  -Hypothyroid controlled  -Lymphedema continue with continuous wrapping per lymphedema clinic as needed  -Lumbosacral disease cut down gabapentin take it only at bedtime  -Carotid atherosclerosis ultrasound not changed less than 50% no TIA continue on aspirin no recurrent symptoms  Follow-up 3 months         Hypertension  Pertinent negatives include no chest pain, headaches or palpitations.   Hyperlipidemia  Pertinent negatives include no chest pain.          Review of Systems   Constitutional:  Negative for fatigue, fever and " unexpected weight change.   HENT:  Negative for congestion, ear discharge, ear pain, mouth sores, sinus pain and sore throat.    Eyes:  Negative for visual disturbance.   Respiratory:  Negative for cough and wheezing.    Cardiovascular:  Negative for chest pain, palpitations and leg swelling.   Gastrointestinal:  Negative for abdominal pain, blood in stool and diarrhea.   Genitourinary:  Negative for difficulty urinating.   Musculoskeletal:  Negative for arthralgias.   Skin:  Negative for rash.   Neurological:  Negative for dizziness and headaches.   Hematological:  Does not bruise/bleed easily.   Psychiatric/Behavioral:  Negative for behavioral problems.    All other systems reviewed and are negative.      Objective   Lab Results   Component Value Date    HGBA1C 5.9 (A) 06/06/2023      /60   Pulse 58   Temp 36.2 °C (97.1 °F)   Wt 102 kg (225 lb)   SpO2 91%   BMI 41.15 kg/m²     Physical Exam  Vitals and nursing note reviewed.   Constitutional:       Appearance: Normal appearance.   HENT:      Head: Normocephalic.      Nose: Nose normal.   Eyes:      Conjunctiva/sclera: Conjunctivae normal.      Pupils: Pupils are equal, round, and reactive to light.   Cardiovascular:      Rate and Rhythm: Regular rhythm.   Pulmonary:      Effort: Pulmonary effort is normal.      Breath sounds: Normal breath sounds.   Abdominal:      General: Abdomen is flat.      Palpations: Abdomen is soft.   Musculoskeletal:      Cervical back: Neck supple.   Skin:     General: Skin is warm.   Neurological:      General: No focal deficit present.      Mental Status: She is oriented to person, place, and time.   Psychiatric:         Mood and Affect: Mood normal.         Assessment/Plan   Ashli was seen today for hypertension and hyperlipidemia.  Diagnoses and all orders for this visit:  Peripheral vascular disease, unspecified (CMS/HCC) (Primary)  Hypercholesterolemia  -     rosuvastatin (Crestor) 10 mg tablet; Take 1 tablet (10 mg) by  "mouth once daily.  Diabetes 1.5, managed as type 2 (CMS/HCC)  Non-pressure chronic ulcer of other part of right foot limited to breakdown of skin (CMS/[facility])  Body mass index (BMI) 40.0-44.9, adult (CMS/[facility])  Malignant neoplasm of left breast in female, estrogen receptor positive, unspecified site of breast (CMS/[facility])  Stage 3a chronic kidney disease (CMS/[facility])  Lymphedema due to chronic inflammation  Prediabetes  Lumbar degenerative disc disease  Other orders  -     Follow Up In Primary Care - Established  -     Follow Up In Primary Care - Established; Future   -- Chronic bilateral lower extremity lymphedema  Patient comes about daily dressing with Aloe Vesta 3 continue with compression hose keep the legs elevated, doing much better  -History of lumbosacral surgery and chronic neuropathy  Patient to continue gabapentin 300 mg at bedtime  topiramate 25 mg 1  tablets twice a day patient   -Diabetes and leg swelling patient did not tolerate metformin due to diarrhea now on Farxiga very expensive patient will discontinue at this time continue with diet controlled reevaluate patient in 3 months arrange with hemoglobin A1c next appointment  - Recurrent phlebitis and superficial cellulitis now controlled to continue with   - Patient seen by oncology underlying MUGUS follow-up closely  -History of breast cancer remission follow-up hematology oncology, no recurrence  -- Bilateral carpal tunnel syndrome treated conservatively declined surgical intervention at this time  - Underlying sleep apnea continue with fatigue and tiredness and weight gain declined CPAP declined evaluation by sleep clinic  Morbid obesity  -\" Obesity patient now BMI 39 lost about 10 pounds since last time continue topiramate 50 mg twice a day continue on 1000-calorie reduction  -Neuropathy continues gabapentin 300 mg in p.m.  -Hypertension, improving, counseled about weight loss  -Hypothyroid controlled  -Lymphedema continue with continuous wrapping per " lymphedema clinic as needed  -Lumbosacral disease cut down gabapentin take it only at bedtime  -Carotid atherosclerosis ultrasound not changed less than 50% no TIA continue on aspirin no recurrent symptoms  Follow-up 3 months

## 2024-04-05 ENCOUNTER — TELEPHONE (OUTPATIENT)
Dept: HEMATOLOGY/ONCOLOGY | Facility: HOSPITAL | Age: 74
End: 2024-04-05
Payer: MEDICARE

## 2024-04-05 ENCOUNTER — LAB (OUTPATIENT)
Dept: LAB | Facility: LAB | Age: 74
End: 2024-04-05
Payer: MEDICARE

## 2024-04-05 DIAGNOSIS — D47.2 MGUS (MONOCLONAL GAMMOPATHY OF UNKNOWN SIGNIFICANCE): ICD-10-CM

## 2024-04-05 LAB
ALBUMIN SERPL BCP-MCNC: 4.2 G/DL (ref 3.4–5)
ALP SERPL-CCNC: 88 U/L (ref 33–136)
ALT SERPL W P-5'-P-CCNC: 23 U/L (ref 7–45)
ANION GAP SERPL CALC-SCNC: 15 MMOL/L (ref 10–20)
AST SERPL W P-5'-P-CCNC: 22 U/L (ref 9–39)
BASOPHILS # BLD AUTO: 0.07 X10*3/UL (ref 0–0.1)
BASOPHILS NFR BLD AUTO: 0.7 %
BILIRUB SERPL-MCNC: 0.3 MG/DL (ref 0–1.2)
BUN SERPL-MCNC: 33 MG/DL (ref 6–23)
CALCIUM SERPL-MCNC: 9.6 MG/DL (ref 8.6–10.3)
CHLORIDE SERPL-SCNC: 108 MMOL/L (ref 98–107)
CO2 SERPL-SCNC: 23 MMOL/L (ref 21–32)
CREAT SERPL-MCNC: 1.04 MG/DL (ref 0.5–1.05)
EGFRCR SERPLBLD CKD-EPI 2021: 56 ML/MIN/1.73M*2
EOSINOPHIL # BLD AUTO: 0.16 X10*3/UL (ref 0–0.4)
EOSINOPHIL NFR BLD AUTO: 1.6 %
ERYTHROCYTE [DISTWIDTH] IN BLOOD BY AUTOMATED COUNT: 15.8 % (ref 11.5–14.5)
FERRITIN SERPL-MCNC: 80 NG/ML (ref 8–150)
GLUCOSE SERPL-MCNC: 145 MG/DL (ref 74–99)
HCT VFR BLD AUTO: 43 % (ref 36–46)
HGB BLD-MCNC: 13 G/DL (ref 12–16)
IMM GRANULOCYTES # BLD AUTO: 0.06 X10*3/UL (ref 0–0.5)
IMM GRANULOCYTES NFR BLD AUTO: 0.6 % (ref 0–0.9)
IRON SATN MFR SERPL: 16 % (ref 25–45)
IRON SERPL-MCNC: 59 UG/DL (ref 35–150)
LDH SERPL L TO P-CCNC: 193 U/L (ref 84–246)
LYMPHOCYTES # BLD AUTO: 2.33 X10*3/UL (ref 0.8–3)
LYMPHOCYTES NFR BLD AUTO: 23.9 %
MCH RBC QN AUTO: 29.1 PG (ref 26–34)
MCHC RBC AUTO-ENTMCNC: 30.2 G/DL (ref 32–36)
MCV RBC AUTO: 96 FL (ref 80–100)
MONOCYTES # BLD AUTO: 0.75 X10*3/UL (ref 0.05–0.8)
MONOCYTES NFR BLD AUTO: 7.7 %
NEUTROPHILS # BLD AUTO: 6.37 X10*3/UL (ref 1.6–5.5)
NEUTROPHILS NFR BLD AUTO: 65.5 %
NRBC BLD-RTO: 0 /100 WBCS (ref 0–0)
PLATELET # BLD AUTO: 243 X10*3/UL (ref 150–450)
POTASSIUM SERPL-SCNC: 4.1 MMOL/L (ref 3.5–5.3)
PROT SERPL-MCNC: 7.4 G/DL (ref 6.4–8.2)
RBC # BLD AUTO: 4.47 X10*6/UL (ref 4–5.2)
SODIUM SERPL-SCNC: 142 MMOL/L (ref 136–145)
TIBC SERPL-MCNC: 362 UG/DL (ref 240–445)
UIBC SERPL-MCNC: 303 UG/DL (ref 110–370)
WBC # BLD AUTO: 9.7 X10*3/UL (ref 4.4–11.3)

## 2024-04-05 PROCEDURE — 84165 PROTEIN E-PHORESIS SERUM: CPT | Performed by: STUDENT IN AN ORGANIZED HEALTH CARE EDUCATION/TRAINING PROGRAM

## 2024-04-05 PROCEDURE — 82784 ASSAY IGA/IGD/IGG/IGM EACH: CPT

## 2024-04-05 PROCEDURE — 84165 PROTEIN E-PHORESIS SERUM: CPT

## 2024-04-05 PROCEDURE — 86334 IMMUNOFIX E-PHORESIS SERUM: CPT

## 2024-04-05 PROCEDURE — 84155 ASSAY OF PROTEIN SERUM: CPT

## 2024-04-05 PROCEDURE — 36415 COLL VENOUS BLD VENIPUNCTURE: CPT

## 2024-04-05 PROCEDURE — 83521 IG LIGHT CHAINS FREE EACH: CPT

## 2024-04-05 PROCEDURE — 86320 SERUM IMMUNOELECTROPHORESIS: CPT | Performed by: STUDENT IN AN ORGANIZED HEALTH CARE EDUCATION/TRAINING PROGRAM

## 2024-04-05 NOTE — TELEPHONE ENCOUNTER
Contacted patient to have lab work drawn prior to 4/8/24 office visit. Patient states she will most likely not be out in [place] prior to the visit and will have them drawn on Monday.

## 2024-04-06 LAB
IGA SERPL-MCNC: 222 MG/DL (ref 70–400)
IGG SERPL-MCNC: 965 MG/DL (ref 700–1600)
IGM SERPL-MCNC: 97 MG/DL (ref 40–230)
PROT SERPL-MCNC: 7.3 G/DL (ref 6.4–8.2)

## 2024-04-07 LAB
KAPPA LC SERPL-MCNC: 4.14 MG/DL (ref 0.33–1.94)
KAPPA LC/LAMBDA SER: 1.58 {RATIO} (ref 0.26–1.65)
LAMBDA LC SERPL-MCNC: 2.62 MG/DL (ref 0.57–2.63)

## 2024-04-07 NOTE — PROGRESS NOTES
"[facility]    PATIENT VISIT INFORMATION    Visit Type: Follow up new provider     Referring Provider: [facility]  Reason for referral: Breast cancer surveillance   Primary Practice Provider: DAMON Rachel MD    CANCER/HEMATOLGOY HISTORY    73 -year-old white female previously seen by Dr. Chen and Dr. Dang, and Dr Parada with a history of stage I left breast invasive ductal carcinoma ER/OK positive and HER-2/vijay negative status post lumpectomy 9/2012, adjuvant  radiation 11/7/2012, and 5 years of adjuvant aromatase inhibitor (took Arimidex for 9 months then transitioned to letrozole given worsening joint aches) completed approximately January 2018.      Bilateral mammogram 12/11/2023 showed calcifications felt to be dystrophic, no evidence of malignancy recommending follow-up in 1 year.       PET scan 8/30/2023 done for the MGUS and history of lung nodules was unremarkable except for hypermetabolic lower extremities possible cellulitis, referred patient to PCP.     CT CHEST WO IV CONTRAST 5/4/2020  Small sclerotic focus in the lower sternal body is stable. No new  suspicious sclerotic or lytic lesions. Degenerative changes in the  spine.No significant interval change. Stable upper lobe ground-glass  opacities.    Bone density performed 8/20/2018 normal findings.    Patient had chronic back and hip  pain with associated neuropathy and neurogenic claudication, status post L2-L5 decompressive laminectomy secondary to severe spinal stenosis 6/4/2021,   also history of epidural injections-pain stable.  She uses a cane when going outside and a walker at home \"preventatively.\"  Patient has a history of lung nodules, seen  by Dr. Abreu 5/2020 noting stable imaging with follow-up recommended with him in 12 months, noted prior breast cancer with adjuvant radiation-7/2021.    She has been on oral B12 for several years, which she takes about once a week, never on " injections, recommended by her PCP.       HISTORY OF PRESENT ILLNESS     ID Statement: Ashli Justice is a 73 years old female     Chief Complaint: Breast Cancer Surveillance/MGUS    Interval History:   Ashli presents today for follow-up with new provider for breast cancer surveillance and MGUS.  Generally she has no complaints.  She noted she was fatigued a lot but she has decreased her gabapentin.  She does have neuropathy in her legs and hands.  Her legs have chronic peripheral vascular disease and she has followed with vascular.  The majority of this is controlled by compression stocking.  She reports there is no infection today.    No fevers, hot flashes, illness or infection, unintentional weight loss, headaches, sore throat, acute vision changes, chest pain, change  in chronic leg edema, shortness breath, cough unless she has postnasal drip, nausea or vomiting, abnormal bowel movements, abdominal pain, blood in stool, dysuria or hematuria, pain or neurologic symptoms except as above, rashes or lumps now except chronic stasis dermatitis, abnormal bruising or bleeding, diabetes, anxiety  or depression, breast masses, vaginal discharge or bleeding.   PAST/CURRENT HISTORY     MEDICAL/SURGICAL HISTORY  -Breast cancer as above   -6/2023 monoclonal gammopathy IgG kappa 0.2   -June 2023 AUDREY +1: 320   -Carpal tunnel syndrome   -Glucose intolerance with hemoglobin A1c 5.9 in July 2023 with metformin prescribed but patient stopped due to insomnia  -Hypertension  -Positive Cologuard with colonoscopy 9/30/2021 by Dr. Bryan showing a rectosigmoid polyp which was a hyperplastic polyp, no further endoscopy recommended given her advanced age  -Dyslipidemia   -Vitamin D deficiency  -Pulmonary nodules noted 4/2015.  8/2017 PET scan showed mild diffuse hypermetabolic activity throughout the tissue and muscular structures, groundglass nodular density on CT not evidence of but noted small size, mild activity  right adrenal.  5/2020 CT  chest showed stable interstitial changes left anterior upper lobe could be from previous radiation, stable 1.1 cm left apex groundglass opacity, 4 mm faint groundglass opacity right upper lobe stable, emphysematous changes,  atelectasis lung bases, no new suspicious nodules, stable pleural thickening and scarring right lower lobe, probable right adrenal adenoma, stable sclerotic focus lower sternal body; overall no significant change with stable upper lobe groundglass opacities.   -Restless leg syndrome  -B12: At her appointment on 7/1/2021 patient was taking oral B12 weekly, never injections, started a few years prior at the recommendation of her PCP   -Lumbar/cervical radiculitis with a history of epidural injections, feet neuropathy, neurogenic claudication, osteoarthritis, sciatica, spinal stenosis-status post back surgery   -Trigger finger mentioned in the chart but patient denied on 7/1/2021  -Hypothyroidism, started on medication 11/2020  -Lymphedema lower extremities  -Carotid disease  -Abnormal  LFTs with transaminases up to the 70s in 2012 and 60s in 2015 but subsequently normal as of 6/2020  -Cellulitis of the lower extremities   -Venous insufficiency, stasis dermatitis  -Fatty liver on imaging 2015  -Positive stool occult with previous colon polyps (hyperplastic polyp 2005) and colonoscopy 7/2008 with small internal hemorrhoids and diverticula otherwise unremarkable.  -Blood loss anemia with hemoglobin down to  9.5 6/2021 after back surgery  -6/2021 apnea during back surgery with sleep apnea evaluation recommended but patient declined 6/2021   -Former smoker  -glaucoma and dry eyes  -Lower extremity cellulitis/Vituz treated with doxycycline by PCP September 2023 and November 2023  -Rheumatology evaluation in 11/2023 as above     Past surgical history  Breast as above, D&C, bladder sling, tubal ligation, appendectomy, left total hip 4/2015,  laser eye surgery, cataract surgery, colonoscopies as above,  L2-L5 decompressive laminectomy on 6/4/2021, laser eye surgery    SOCIAL HISTORY  -Lives Alone  -Work place: retired   -Tobacco/smokeless use: Smoked for 30 years, 1.5-2  packs/day, quit at age 46.   -Alcohol: Denies   -Illicit drug or marijuana use: Denies   -Anglican or Spiritual beliefs: None reported  -Social Determinates of Health Concerns: None reported     FAMILY HISTORY  -No other known history of hematologic, bleeding, clotting, autoimmune, genetic, or malignant disorders in the family.     OCCUPATIONAL/ENVIRONMENTAL HISTORY/EXPOSURES:  -None reported    Active Problems, Allergy List, Medication List, and PMH/PSH/FH/Social Hx have been reviewed and reconciled in chart. Updates made when necessary.     REVIEW OF SYSTEMS   A review of systems has been completed and are negative for complaints except what is stated in the assessment, HPI, IH, ROS, and/or past medical history.    ALLERGIES AND MEDICATIONS     Allergies and Intolerances:   Allergies   Allergen Reactions    Milk Diarrhea      Medication Profile:   Current Outpatient Medications   Medication Instructions    acetaminophen (TYLENOL) 325 mg, oral, Every 6 hours PRN    azilsartan-chlorthalidone (Edarbyclor) 40-25 mg tablet tablet 1 tablet, oral, Daily    brimonidine (AlphaGAN P) 0.2 % ophthalmic solution     carvedilol (COREG) 25 mg, oral, Daily    cholecalciferol (VITAMIN D-3) 125 mcg, oral, Daily    cyanocobalamin (Vitamin B-12) 1,000 mcg tablet 1 tablet, oral, Daily    dorzolamide-timoloL (Cosopt) 22.3-6.8 mg/mL ophthalmic solution ophthalmic (eye), 2 times daily    gabapentin (NEURONTIN) 300 mg, oral, Daily    latanoprost (Xalatan) 0.005 % ophthalmic solution ophthalmic (eye)    levothyroxine (SYNTHROID, LEVOXYL) 50 mcg, oral, Daily    omega 3-dha-epa-fish oil (Fish OiL) 1,000 mg (120 mg-180 mg) capsule 1,000 mg, oral, Daily    pramipexole (MIRAPEX) 0.75 mg, oral, Nightly    rosuvastatin (CRESTOR) 10 mg, oral, Daily    topiramate (TOPAMAX) 25 mg,  "oral, 2 times daily    topiramate (TOPAMAX) 25 mg, oral, 2 times daily      Available Vaccination Record:   Immunization History   Administered Date(s) Administered    Flu vaccine, quadrivalent, high-dose, preservative free, age 65y+ (FLUZONE) 09/25/2023    Influenza, High Dose Seasonal, Preservative Free 09/13/2015, 11/04/2016, 09/01/2017, 10/09/2018, 11/18/2019, 10/12/2020, 09/29/2021, 10/05/2022    Influenza, Unspecified 10/06/2013    Influenza, seasonal, injectable 09/05/2012, 08/17/2014    Pfizer Gray Cap SARS-CoV-2 07/19/2022    Pfizer Purple Cap SARS-CoV-2 03/02/2021, 03/23/2021, 10/15/2021    Pneumococcal conjugate vaccine, 13-valent (PREVNAR 13) 04/03/2018    Pneumococcal polysaccharide vaccine, 23-valent, age 2 years and older (PNEUMOVAX 23) 08/12/2019    Zoster vaccine, recombinant, adult (SHINGRIX) 06/01/2019, 08/01/2019    Zoster, live 07/01/2011      PHYSICAL EXAM     Vital Signs/Measurements:       11/13/2023     1:01 PM 11/14/2023     2:41 PM 12/14/2023    12:36 PM 12/21/2023     1:13 PM 12/21/2023     1:34 PM 4/4/2024     1:00 PM 4/8/2024    11:03 AM   Vitals   Systolic 133 138 147 140 130 130 139   Diastolic 82 62 71 78 70 60 81   Heart Rate 52 61 56 70  58 54   Temp  36.3 °C (97.3 °F) 36.4 °C (97.5 °F) 36.3 °C (97.3 °F)  36.2 °C (97.1 °F) 36.2 °C (97.2 °F)   Resp   18    16   Height (in) 1.6 m (5' 3\")  1.6 m (5' 3\") 1.575 m (5' 2\")   1.575 m (5' 2\")   Weight (lb) 219.8 221.8 220.46 224  225 223.77   BMI 38.94 kg/m2 39.29 kg/m2 39.05 kg/m2 40.97 kg/m2  41.15 kg/m2 40.93 kg/m2   BSA (m2) 2.11 m2 2.12 m2 2.11 m2 2.11 m2  2.11 m2 2.11 m2   Visit Report Report Report Report Report Report Report Report        Performance:   ECOG Performance Status: 1     Grade ECOG performance status   0 Fully active, able to carry on all pre-disease performance without restriction   1 Restricted in physically strenuous activity but ambulatory and able to carry out work of a light or sedentary nature, e.g., light " housework, office work   2 Ambulatory and capable of all selfcare but unable to carry out any work activities; Up and about more than 50% of waking hours   3 Capable of only limited selfcare, confined to bed or chair more than 50% of waking hours   4 Completely disabled; Cannot carry out any selfcare; Totally confined to bed or chair   5 Dead     Physical Exam:  General: Patient is awake/alert/oriented x3, no distress, Nourished, hydrated, alert and cooperative, ambulating without difficulty  Skin: Expected color for ethnicity, good turgor, dry, no prominent lesions, rashes, unusual bruising, or bleeding   Hair: Normal texture and distribution   Nails: Normal color, no deformities    HEENT:   Head: Normocephalic, atraumatic, no visible or palpable masses, depressions, or scarring   Eyes: Conjunctiva clear, sclera non-icteric, no exudates or hemorrhages   Ears: nl appearance, hearing intact    Nose: no external lesions, mucosa non-inflamed, no rhinorrhea   Mouth: Mucous membranes moist, no lesions, sores, bleeding, or erythema     Head/Neck: Neck supple, no apparent injury, thyroid without mass or tenderness, No JVD, trachea midline, no bruits appreciated    Respiratory/Thorax: Patent airways, CTAB, chest symmetry, normal inspiratory and expiratory effort    Cardiovascular: Regular rate and rhythm, no murmur or gallop, no carotid bruit or thrills    Gastrointestinal: Bowel sounds normal, non-distended, soft, no tenderness, no masses or hernia, or organomegaly appreciated    Genitourinary: deferred   Musculoskeletal: Normal gait, normal range of motion, no pain on palpation of spine, no deformity, normal strength for baseline, no atrophy, and no CVA tenderness appreciated   Extremities: No amputations or deformities, cyanosis, edema or viscosities, peripheral pulses intact   Neurological: Sensation present to touch, intact senses, motor response and reflexes normal, normal strength   Breast: Declined assessment today,  previous assessment 3 months ago by previous provider documented in her note on 12/14/2023.   Lymphatic: No significant lymphadenopathy   Psychological: Intact recent and remote memory, judgement, and insight. Appropriate mood, affect, and behavior          RESULTS/DATA     Labs:     Lab Results   Component Value Date    WBC 9.7 04/05/2024    NEUTROABS 6.37 (H) 04/05/2024    IGABSOL 0.06 04/05/2024    LYMPHSABS 2.33 04/05/2024    MONOSABS 0.75 04/05/2024    EOSABS 0.16 04/05/2024    BASOSABS 0.07 04/05/2024    RBC 4.47 04/05/2024    MCV 96 04/05/2024    MCHC 30.2 (L) 04/05/2024    HGB 13.0 04/05/2024    HCT 43.0 04/05/2024     04/05/2024     Lab Results   Component Value Date    RETICCTPCT 2.2 (H) 03/31/2022      Lab Results   Component Value Date    CREATININE 1.04 04/05/2024    BUN 33 (H) 04/05/2024    EGFR 56 (L) 04/05/2024     04/05/2024    K 4.1 04/05/2024     (H) 04/05/2024    CO2 23 04/05/2024      Lab Results   Component Value Date    ALT 23 04/05/2024    AST 22 04/05/2024    ALKPHOS 88 04/05/2024    BILITOT 0.3 04/05/2024      Lab Results   Component Value Date    TSH 3.87 06/06/2023     Lab Results   Component Value Date    TSH 3.87 06/06/2023    THYROIDPAB <28 11/14/2023     Lab Results   Component Value Date    IRON 59 04/05/2024    TIBC 362 04/05/2024    FERRITIN 80 04/05/2024      Lab Results   Component Value Date    CQMKCBLH07 575 06/06/2023      Lab Results   Component Value Date    FOLATE >24.0 06/06/2023     Lab Results   Component Value Date    AUDREY Positive (A) 11/14/2023    RF 12 11/14/2023    SEDRATE 23 06/06/2023     Lab Results   Component Value Date     04/05/2024       Lab Results   Component Value Date    SPEP Aberrant band detected. See immunofixation. 12/14/2023     Lab Results   Component Value Date     04/05/2024    IGM 97 04/05/2024     04/05/2024        Radiology/Studies:   Please see above    ASSESSMENT/PLAN     Assessment and Plan:   #1.   Left breast  invasive ductal carcinoma grade 1 on final pathology, ER/OH positive, HER-2/vijay negative, also intermediate grade DCIS, 0.5 cm, lymph nodes negative, [id number], status post lumpectomy 9/2012, adjuvant radiation completed 11/2012, and 5 years of aromatase  inhibitor (tamoxifen for 9 months transition to letrozole given joint aches) completed around January 2018.    No evidence of disease although patient declined breast exam.  Negative hepatitis panel 8/2023.  Next mammogram due 12/20/2024.  NCCN guidelines discussed.    #2.  MGUS    #IgG kappa monoclonal gammopathy low level 0.2 in June 2023 confirmed July 2023 with no hypercalcemia, anemia, renal failure, bony lesions related to this, 8/30/2023 PET scan unremarkable except for hypermetabolism lower extremities felt to be cellulitis and she was treated for cellulitis by PCP with symptoms improved, 8/2023 normal LDH and negative spot and 24-hour UPEP with mildly elevated kappa light chain but normal ratio and lambda with IgG kappa 0.1 and normal immunoglobulins, so far appears to be MGUS, could be related to an undiagnosed rheumatologic condition given  her positive AUDREY June 2023 although seen by rheumatology 11/2023 and other panel was negative felt not to have an inflammatory arthritis.    Monoclonal gammopathy was found in the setting of chronic pain with neuropathy which does not appear related to her monoclonal gammopathy but rather her back issues and possibly due to diabetes.    ~Possible carpal tunnel syndrome July 2023 for which patient declined surgery    ~History of colon polyps with colonoscopy 2008 recommending follow-up in 5 years.  7/1/2021 patient declined further colonoscopies but a Cologuard from PCP prompting a colonoscopy 9/30/2021 by Dr. Bryan showing a hyperplastic rectosigmoid polyp; he recommended no further follow-up given her advanced age.     #3.  Pulmonary nodules noted 4/2015.    8/2017 PET scan showed mild diffuse  hypermetabolic activity throughout the tissue and  muscular structures, groundglass nodular density on CT not evident but noted small size, mild activity right adrenal.  5/2020  CT chest showed stable interstitial changes left anterior upper lobe could be from previous radiation, stable 1.1 cm left apex groundglass opacity, 4 mm faint groundglass opacity right upper lobe stable, emphysematous changes, atelectasis lung bases,  no new suspicious nodules, stable pleural thickening and scarring right lower lobe, probable right adrenal adenoma, stable sclerotic focus lower sternal body; overall no significant change with stable upper lobe groundglass opacities. Pt was seen by CT  surgery 5/2020 most recently recommending follow-up in 1 year with a CT. 7/1/2021 patient declined further CT surgery follow-up as well as CT scans of the chest as she felt that her findings were  stable- Dr Parada did explain she could have a malignancy that might be missed.    4/8/2024-she agreed to a CT scan if it can be performed at [place].  This has been ordered.     ~7/2023 chest x-ray unremarkable.  PET scan showed no significant lung lesions 8/30/2023.     ~Vitamin D deficiency, low 8/2022 status post supplement.  Patient declined further labs from me regarding this.       ~Lymphedema of the lower extremities, controlled with compression stockings.  Assessment likely peripheral vascular disease.     ~Fatty liver noted on ultrasound from 2/24/2015     ~Back/hip pain with neurogenic claudication status post back surgery 6/2021 , symptoms not significantly better after surgery.     ~6/2021 postoperative anemia likely from blood loss, leukocytosis, renal insufficiency, hypocalcemia.  Labs better on follow-up.  Normal CBC November 2023.    ~4/8/2024 CBC fairly unremarkable other than neutrophil absolute count 6.37 transient elevation over the years.     #4.  Mild Anemia and Borderline B12  7/2021 mild anemia hemoglobin 11s might have been  residual from her surgery as well as CKD, also thrombocytopenia at 114 but recheck to 255 so possible lab error.  3/2022 normal CBC, normal LDH, reticulocyte count 2.2%, iron 19% with ferritin 78 which  was rechecked in August 2022 and normal, normal folate March 2022.  8/2022 normal CBC, normal iron studies.  June 2023 normal CBC.  Normal CBC November 2023.  November 2023 MCV 99 somewhat borderline but still within normal limits, 6/2023 normal B12 and folate, can be monitored periodically with further evaluation depending on trend.  4/8/2024- B12 pending along with MGUS labs.  Notable GFR 56, normal LFTs.  No elevation in calcium.  Patient does have chronic neuropathy, PVD also contributory.        **Please follow with specialties as scheduled for other comorbidities and routine health screenings.**    I have reviewed the patient's medical record including provider notes, laboratory and testing results, imaging, and procedures available within the system and outside the system pertinent to patient care.     Follow up:    RTC:  -Follow-up visit 3 months with labs    Medications:  -N/A    Imaging/Testing:  -Mammogram due 12/20/2024  -CT chest without contrast due    Referral:  -NA    Other Pertinent Appointments:  -PCP 7/8/2024  -Ophthalmology 8/1/2024      Patient Discussion Summary:  Discussed plan of care in detail. Patient states understanding and in agreement. Answered all questions. They will call with any additional questions and/or concerns.    Thank you for allowing me to participate in your care. It was a pleasure meeting you.    Sincerely,  Beena Lacey, APRN-CNP       This document may have been written by voice recognition software.  Please request clarification if there is documentation error or clarification is needed.   Time based billing: Please see documentation within this specific encounter.

## 2024-04-08 ENCOUNTER — OFFICE VISIT (OUTPATIENT)
Dept: HEMATOLOGY/ONCOLOGY | Facility: HOSPITAL | Age: 74
End: 2024-04-08
Payer: MEDICARE

## 2024-04-08 VITALS
TEMPERATURE: 97.2 F | WEIGHT: 223.77 LBS | HEART RATE: 54 BPM | SYSTOLIC BLOOD PRESSURE: 139 MMHG | DIASTOLIC BLOOD PRESSURE: 81 MMHG | RESPIRATION RATE: 16 BRPM | HEIGHT: 62 IN | OXYGEN SATURATION: 92 % | BODY MASS INDEX: 41.18 KG/M2

## 2024-04-08 DIAGNOSIS — D47.2 MGUS (MONOCLONAL GAMMOPATHY OF UNKNOWN SIGNIFICANCE): Primary | ICD-10-CM

## 2024-04-08 DIAGNOSIS — C50.912 MALIGNANT NEOPLASM OF LEFT BREAST IN FEMALE, ESTROGEN RECEPTOR POSITIVE, UNSPECIFIED SITE OF BREAST (MULTI): ICD-10-CM

## 2024-04-08 DIAGNOSIS — Z17.0 MALIGNANT NEOPLASM OF LEFT BREAST IN FEMALE, ESTROGEN RECEPTOR POSITIVE, UNSPECIFIED SITE OF BREAST (MULTI): ICD-10-CM

## 2024-04-08 PROCEDURE — 99214 OFFICE O/P EST MOD 30 MIN: CPT

## 2024-04-08 PROCEDURE — 1160F RVW MEDS BY RX/DR IN RCRD: CPT

## 2024-04-08 PROCEDURE — 1157F ADVNC CARE PLAN IN RCRD: CPT

## 2024-04-08 PROCEDURE — 1125F AMNT PAIN NOTED PAIN PRSNT: CPT

## 2024-04-08 PROCEDURE — 3075F SYST BP GE 130 - 139MM HG: CPT

## 2024-04-08 PROCEDURE — 3079F DIAST BP 80-89 MM HG: CPT

## 2024-04-08 PROCEDURE — 1159F MED LIST DOCD IN RCRD: CPT

## 2024-04-08 ASSESSMENT — PATIENT HEALTH QUESTIONNAIRE - PHQ9
2. FEELING DOWN, DEPRESSED OR HOPELESS: NOT AT ALL
1. LITTLE INTEREST OR PLEASURE IN DOING THINGS: NOT AT ALL
SUM OF ALL RESPONSES TO PHQ9 QUESTIONS 1 AND 2: 0

## 2024-04-08 ASSESSMENT — COLUMBIA-SUICIDE SEVERITY RATING SCALE - C-SSRS
1. IN THE PAST MONTH, HAVE YOU WISHED YOU WERE DEAD OR WISHED YOU COULD GO TO SLEEP AND NOT WAKE UP?: NO
2. HAVE YOU ACTUALLY HAD ANY THOUGHTS OF KILLING YOURSELF?: NO
6. HAVE YOU EVER DONE ANYTHING, STARTED TO DO ANYTHING, OR PREPARED TO DO ANYTHING TO END YOUR LIFE?: NO

## 2024-04-08 ASSESSMENT — LIFESTYLE VARIABLES
AUDIT-C TOTAL SCORE: 0
SKIP TO QUESTIONS 9-10: 1
HOW OFTEN DO YOU HAVE A DRINK CONTAINING ALCOHOL: NEVER
HOW MANY STANDARD DRINKS CONTAINING ALCOHOL DO YOU HAVE ON A TYPICAL DAY: PATIENT DOES NOT DRINK
HOW OFTEN DO YOU HAVE SIX OR MORE DRINKS ON ONE OCCASION: NEVER

## 2024-04-08 ASSESSMENT — ENCOUNTER SYMPTOMS
OCCASIONAL FEELINGS OF UNSTEADINESS: 0
LOSS OF SENSATION IN FEET: 1
DEPRESSION: 0

## 2024-04-08 ASSESSMENT — PAIN SCALES - GENERAL: PAINLEVEL: 8

## 2024-04-09 ENCOUNTER — TELEPHONE (OUTPATIENT)
Dept: HEMATOLOGY/ONCOLOGY | Facility: HOSPITAL | Age: 74
End: 2024-04-09
Payer: MEDICARE

## 2024-04-09 NOTE — TELEPHONE ENCOUNTER
Reached out to patient regarding her need for a follow up appointment with MIGUELANGEL Zavaleta. Scheduled patient for July 8th, 2024 @ 2 pm. Got the patients CT and Mammo scheduled as well. Patient verbalized and agreed to appointment.

## 2024-04-16 DIAGNOSIS — E78.00 HYPERCHOLESTEROLEMIA: ICD-10-CM

## 2024-04-16 RX ORDER — ROSUVASTATIN CALCIUM 10 MG/1
10 TABLET, COATED ORAL DAILY
Qty: 14 TABLET | Refills: 0 | Status: SHIPPED | OUTPATIENT
Start: 2024-04-16 | End: 2024-04-17 | Stop reason: SDUPTHER

## 2024-04-17 DIAGNOSIS — I10 BENIGN ESSENTIAL HYPERTENSION: ICD-10-CM

## 2024-04-17 DIAGNOSIS — E78.00 HYPERCHOLESTEROLEMIA: ICD-10-CM

## 2024-04-17 RX ORDER — ROSUVASTATIN CALCIUM 10 MG/1
10 TABLET, COATED ORAL DAILY
Qty: 90 TABLET | Refills: 0 | Status: SHIPPED | OUTPATIENT
Start: 2024-04-17

## 2024-04-17 RX ORDER — CARVEDILOL 25 MG/1
25 TABLET ORAL DAILY
Qty: 90 TABLET | Refills: 0 | Status: SHIPPED | OUTPATIENT
Start: 2024-04-17

## 2024-04-22 DIAGNOSIS — M51.36 LUMBAR DEGENERATIVE DISC DISEASE: ICD-10-CM

## 2024-04-22 DIAGNOSIS — E55.9 VITAMIN D DEFICIENCY: ICD-10-CM

## 2024-04-22 RX ORDER — GABAPENTIN 300 MG/1
300 CAPSULE ORAL DAILY
Qty: 90 CAPSULE | Refills: 0 | Status: SHIPPED | OUTPATIENT
Start: 2024-04-22 | End: 2024-05-16

## 2024-04-22 RX ORDER — CHOLECALCIFEROL (VITAMIN D3) 125 MCG
125 CAPSULE ORAL DAILY
Qty: 90 CAPSULE | Refills: 1 | Status: SHIPPED | OUTPATIENT
Start: 2024-04-22

## 2024-04-23 ENCOUNTER — HOSPITAL ENCOUNTER (OUTPATIENT)
Dept: RADIOLOGY | Facility: HOSPITAL | Age: 74
Discharge: HOME | End: 2024-04-23
Payer: MEDICARE

## 2024-04-23 DIAGNOSIS — C50.912 MALIGNANT NEOPLASM OF LEFT BREAST IN FEMALE, ESTROGEN RECEPTOR POSITIVE, UNSPECIFIED SITE OF BREAST (MULTI): ICD-10-CM

## 2024-04-23 DIAGNOSIS — Z17.0 MALIGNANT NEOPLASM OF LEFT BREAST IN FEMALE, ESTROGEN RECEPTOR POSITIVE, UNSPECIFIED SITE OF BREAST (MULTI): ICD-10-CM

## 2024-04-23 DIAGNOSIS — D47.2 MGUS (MONOCLONAL GAMMOPATHY OF UNKNOWN SIGNIFICANCE): ICD-10-CM

## 2024-04-23 PROCEDURE — 71250 CT THORAX DX C-: CPT

## 2024-04-23 PROCEDURE — 71250 CT THORAX DX C-: CPT | Performed by: RADIOLOGY

## 2024-05-07 DIAGNOSIS — G25.81 RESTLESS LEG SYNDROME: ICD-10-CM

## 2024-05-07 RX ORDER — PRAMIPEXOLE DIHYDROCHLORIDE 1.5 MG/1
TABLET ORAL
Qty: 45 TABLET | Refills: 3 | Status: SHIPPED | OUTPATIENT
Start: 2024-05-07

## 2024-05-16 DIAGNOSIS — M51.36 LUMBAR DEGENERATIVE DISC DISEASE: ICD-10-CM

## 2024-05-16 RX ORDER — GABAPENTIN 300 MG/1
300 CAPSULE ORAL DAILY
Qty: 90 CAPSULE | Refills: 0 | Status: SHIPPED | OUTPATIENT
Start: 2024-05-16 | End: 2024-08-14

## 2024-06-13 ENCOUNTER — TELEPHONE (OUTPATIENT)
Dept: HEMATOLOGY/ONCOLOGY | Facility: HOSPITAL | Age: 74
End: 2024-06-13
Payer: MEDICARE

## 2024-06-13 NOTE — TELEPHONE ENCOUNTER
Patient is scheduled for an in-person visit with MIGUELANGEL Castle on Monday, 7/8/24 at 2 PM. Due to recent template change, provider will be doing virtual visits only on Mondays. Reached out to patient to offer virtual visit for same date or to reschedule to in-person visit. Patient agreeable to change appointment to virtual visit. Appointment changed to phone visit and time changed to 8:30 AM. Patient verbalized understanding and agreed to appointment changes.

## 2024-06-18 ENCOUNTER — LAB (OUTPATIENT)
Dept: LAB | Facility: LAB | Age: 74
End: 2024-06-18
Payer: MEDICARE

## 2024-06-18 DIAGNOSIS — C50.912 MALIGNANT NEOPLASM OF LEFT BREAST IN FEMALE, ESTROGEN RECEPTOR POSITIVE, UNSPECIFIED SITE OF BREAST (MULTI): ICD-10-CM

## 2024-06-18 DIAGNOSIS — D47.2 MGUS (MONOCLONAL GAMMOPATHY OF UNKNOWN SIGNIFICANCE): ICD-10-CM

## 2024-06-18 DIAGNOSIS — Z17.0 MALIGNANT NEOPLASM OF LEFT BREAST IN FEMALE, ESTROGEN RECEPTOR POSITIVE, UNSPECIFIED SITE OF BREAST (MULTI): ICD-10-CM

## 2024-06-18 LAB
ALBUMIN SERPL BCP-MCNC: 4 G/DL (ref 3.4–5)
ALP SERPL-CCNC: 91 U/L (ref 33–136)
ALT SERPL W P-5'-P-CCNC: 23 U/L (ref 7–45)
ANION GAP SERPL CALC-SCNC: 13 MMOL/L (ref 10–20)
AST SERPL W P-5'-P-CCNC: 22 U/L (ref 9–39)
B2 MICROGLOB SERPL-MCNC: 3.6 MG/L (ref 0.7–2.2)
BASOPHILS # BLD AUTO: 0.06 X10*3/UL (ref 0–0.1)
BASOPHILS NFR BLD AUTO: 0.7 %
BILIRUB SERPL-MCNC: 0.3 MG/DL (ref 0–1.2)
BUN SERPL-MCNC: 33 MG/DL (ref 6–23)
CALCIUM SERPL-MCNC: 9.2 MG/DL (ref 8.6–10.3)
CHLORIDE SERPL-SCNC: 107 MMOL/L (ref 98–107)
CO2 SERPL-SCNC: 25 MMOL/L (ref 21–32)
CREAT SERPL-MCNC: 0.96 MG/DL (ref 0.5–1.05)
EGFRCR SERPLBLD CKD-EPI 2021: 62 ML/MIN/1.73M*2
EOSINOPHIL # BLD AUTO: 0.11 X10*3/UL (ref 0–0.4)
EOSINOPHIL NFR BLD AUTO: 1.2 %
ERYTHROCYTE [DISTWIDTH] IN BLOOD BY AUTOMATED COUNT: 15.2 % (ref 11.5–14.5)
FERRITIN SERPL-MCNC: 74 NG/ML (ref 8–150)
GLUCOSE SERPL-MCNC: 163 MG/DL (ref 74–99)
HCT VFR BLD AUTO: 39.7 % (ref 36–46)
HGB BLD-MCNC: 12.4 G/DL (ref 12–16)
IGA SERPL-MCNC: 224 MG/DL (ref 70–400)
IGG SERPL-MCNC: 962 MG/DL (ref 700–1600)
IGM SERPL-MCNC: 82 MG/DL (ref 40–230)
IMM GRANULOCYTES # BLD AUTO: 0.03 X10*3/UL (ref 0–0.5)
IMM GRANULOCYTES NFR BLD AUTO: 0.3 % (ref 0–0.9)
IRON SATN MFR SERPL: 11 % (ref 25–45)
IRON SERPL-MCNC: 38 UG/DL (ref 35–150)
LYMPHOCYTES # BLD AUTO: 2.15 X10*3/UL (ref 0.8–3)
LYMPHOCYTES NFR BLD AUTO: 24 %
MCH RBC QN AUTO: 29.6 PG (ref 26–34)
MCHC RBC AUTO-ENTMCNC: 31.2 G/DL (ref 32–36)
MCV RBC AUTO: 95 FL (ref 80–100)
MONOCYTES # BLD AUTO: 0.63 X10*3/UL (ref 0.05–0.8)
MONOCYTES NFR BLD AUTO: 7 %
NEUTROPHILS # BLD AUTO: 5.99 X10*3/UL (ref 1.6–5.5)
NEUTROPHILS NFR BLD AUTO: 66.8 %
NRBC BLD-RTO: 0 /100 WBCS (ref 0–0)
PLATELET # BLD AUTO: 247 X10*3/UL (ref 150–450)
POTASSIUM SERPL-SCNC: 4 MMOL/L (ref 3.5–5.3)
PROT SERPL-MCNC: 6.8 G/DL (ref 6.4–8.2)
PROT SERPL-MCNC: 7.2 G/DL (ref 6.4–8.2)
RBC # BLD AUTO: 4.19 X10*6/UL (ref 4–5.2)
SODIUM SERPL-SCNC: 141 MMOL/L (ref 136–145)
TIBC SERPL-MCNC: 334 UG/DL (ref 240–445)
UIBC SERPL-MCNC: 296 UG/DL (ref 110–370)
WBC # BLD AUTO: 9 X10*3/UL (ref 4.4–11.3)

## 2024-06-18 PROCEDURE — 86334 IMMUNOFIX E-PHORESIS SERUM: CPT

## 2024-06-18 PROCEDURE — 82784 ASSAY IGA/IGD/IGG/IGM EACH: CPT

## 2024-06-18 PROCEDURE — 82232 ASSAY OF BETA-2 PROTEIN: CPT

## 2024-06-18 PROCEDURE — 84165 PROTEIN E-PHORESIS SERUM: CPT

## 2024-06-18 PROCEDURE — 36415 COLL VENOUS BLD VENIPUNCTURE: CPT

## 2024-06-18 PROCEDURE — 84155 ASSAY OF PROTEIN SERUM: CPT

## 2024-06-18 PROCEDURE — 83521 IG LIGHT CHAINS FREE EACH: CPT

## 2024-06-19 LAB
KAPPA LC SERPL-MCNC: 3.61 MG/DL (ref 0.33–1.94)
KAPPA LC/LAMBDA SER: 1.46 {RATIO} (ref 0.26–1.65)
LAMBDA LC SERPL-MCNC: 2.47 MG/DL (ref 0.57–2.63)

## 2024-06-20 LAB
ALBUMIN: 3.8 G/DL (ref 3.4–5)
ALPHA 1 GLOBULIN: 0.3 G/DL (ref 0.2–0.6)
ALPHA 2 GLOBULIN: 0.9 G/DL (ref 0.4–1.1)
BETA GLOBULIN: 0.9 G/DL (ref 0.5–1.2)
GAMMA GLOBULIN: 1 G/DL (ref 0.5–1.4)
IMMUNOFIXATION COMMENT: NORMAL
PATH REVIEW - SERUM IMMUNOFIXATION: NORMAL
PATH REVIEW-SERUM PROTEIN ELECTROPHORESIS: NORMAL
PROTEIN ELECTROPHORESIS COMMENT: NORMAL

## 2024-07-08 ENCOUNTER — TELEMEDICINE (OUTPATIENT)
Dept: HEMATOLOGY/ONCOLOGY | Facility: HOSPITAL | Age: 74
End: 2024-07-08
Payer: MEDICARE

## 2024-07-08 ENCOUNTER — APPOINTMENT (OUTPATIENT)
Dept: PRIMARY CARE | Facility: CLINIC | Age: 74
End: 2024-07-08
Payer: MEDICARE

## 2024-07-08 VITALS
DIASTOLIC BLOOD PRESSURE: 70 MMHG | HEART RATE: 66 BPM | OXYGEN SATURATION: 93 % | BODY MASS INDEX: 41.56 KG/M2 | SYSTOLIC BLOOD PRESSURE: 140 MMHG | WEIGHT: 227.2 LBS | TEMPERATURE: 97.6 F

## 2024-07-08 DIAGNOSIS — D47.2 MGUS (MONOCLONAL GAMMOPATHY OF UNKNOWN SIGNIFICANCE): Primary | ICD-10-CM

## 2024-07-08 DIAGNOSIS — E03.9 HYPOTHYROIDISM, UNSPECIFIED TYPE: ICD-10-CM

## 2024-07-08 DIAGNOSIS — I10 BENIGN ESSENTIAL HYPERTENSION: ICD-10-CM

## 2024-07-08 DIAGNOSIS — D47.2 MGUS (MONOCLONAL GAMMOPATHY OF UNKNOWN SIGNIFICANCE): ICD-10-CM

## 2024-07-08 DIAGNOSIS — M51.36 LUMBAR DEGENERATIVE DISC DISEASE: ICD-10-CM

## 2024-07-08 DIAGNOSIS — D50.9 IRON DEFICIENCY ANEMIA, UNSPECIFIED IRON DEFICIENCY ANEMIA TYPE: Primary | ICD-10-CM

## 2024-07-08 DIAGNOSIS — C50.912 MALIGNANT NEOPLASM OF LEFT BREAST IN FEMALE, ESTROGEN RECEPTOR POSITIVE, UNSPECIFIED SITE OF BREAST (MULTI): ICD-10-CM

## 2024-07-08 DIAGNOSIS — E61.1 IRON DEFICIENCY: ICD-10-CM

## 2024-07-08 DIAGNOSIS — Z17.0 MALIGNANT NEOPLASM OF LEFT BREAST IN FEMALE, ESTROGEN RECEPTOR POSITIVE, UNSPECIFIED SITE OF BREAST (MULTI): ICD-10-CM

## 2024-07-08 DIAGNOSIS — G25.81 RESTLESS LEGS: ICD-10-CM

## 2024-07-08 PROCEDURE — 1159F MED LIST DOCD IN RCRD: CPT | Performed by: INTERNAL MEDICINE

## 2024-07-08 PROCEDURE — 99214 OFFICE O/P EST MOD 30 MIN: CPT | Performed by: INTERNAL MEDICINE

## 2024-07-08 PROCEDURE — 1157F ADVNC CARE PLAN IN RCRD: CPT

## 2024-07-08 PROCEDURE — 1157F ADVNC CARE PLAN IN RCRD: CPT | Performed by: INTERNAL MEDICINE

## 2024-07-08 PROCEDURE — 1036F TOBACCO NON-USER: CPT | Performed by: INTERNAL MEDICINE

## 2024-07-08 PROCEDURE — 3078F DIAST BP <80 MM HG: CPT | Performed by: INTERNAL MEDICINE

## 2024-07-08 PROCEDURE — 3077F SYST BP >= 140 MM HG: CPT | Performed by: INTERNAL MEDICINE

## 2024-07-08 PROCEDURE — 99443 PR PHYS/QHP TELEPHONE EVALUATION 21-30 MIN: CPT

## 2024-07-08 PROCEDURE — 1160F RVW MEDS BY RX/DR IN RCRD: CPT | Performed by: INTERNAL MEDICINE

## 2024-07-08 RX ORDER — LEVOTHYROXINE SODIUM 50 UG/1
50 TABLET ORAL DAILY
Qty: 90 TABLET | Refills: 1 | Status: SHIPPED | OUTPATIENT
Start: 2024-07-08

## 2024-07-08 RX ORDER — CARVEDILOL 25 MG/1
25 TABLET ORAL DAILY
Qty: 90 TABLET | Refills: 1 | Status: SHIPPED | OUTPATIENT
Start: 2024-07-08

## 2024-07-08 RX ORDER — FERROUS SULFATE 325(65) MG
325 TABLET, DELAYED RELEASE (ENTERIC COATED) ORAL
Qty: 90 TABLET | Refills: 3 | Status: SHIPPED | OUTPATIENT
Start: 2024-07-08 | End: 2025-07-08

## 2024-07-08 ASSESSMENT — ENCOUNTER SYMPTOMS
PALPITATIONS: 0
SINUS PAIN: 0
DIARRHEA: 0
HEADACHES: 0
SORE THROAT: 0
BLOOD IN STOOL: 0
WHEEZING: 0
FATIGUE: 0
DIFFICULTY URINATING: 0
HYPERTENSION: 1
ARTHRALGIAS: 0
DIZZINESS: 0
FEVER: 0
BRUISES/BLEEDS EASILY: 0
ABDOMINAL PAIN: 0
UNEXPECTED WEIGHT CHANGE: 0
COUGH: 0

## 2024-07-08 NOTE — PROGRESS NOTES
[facility]    PATIENT VISIT INFORMATION    Visit Type: Follow up  I performed this visit using real-time telehealth tools, including an audio/video connection between (Ashli Justice at her home) and (Beena Lacey, CNP [facility])  Patient consents to telemedicine service today and understands the limitations of the visit, no physical examination and all issues may not be able to be addressed today, other than brief neuro and psych assessment.     Referring Provider: [facility]  Reason for referral: Breast cancer surveillance   Primary Practice Provider: DAMON Rachel MD    CANCER/HEMATOLGOY HISTORY    73 -year-old white female previously seen by Dr. Chen and Dr. Dang, and Dr Parada with a history of stage I left breast invasive ductal carcinoma ER/NC positive and HER-2/vijay negative status post lumpectomy 9/2012, adjuvant  radiation 11/7/2012, and 5 years of adjuvant aromatase inhibitor (took Arimidex for 9 months then transitioned to letrozole given worsening joint aches) completed approximately January 2018.      Bilateral mammogram 12/11/2023 showed calcifications felt to be dystrophic, no evidence of malignancy recommending follow-up in 1 year.       PET scan 8/30/2023 done for the MGUS and history of lung nodules was unremarkable except for hypermetabolic lower extremities possible cellulitis, referred patient to PCP.     CT CHEST WO IV CONTRAST 5/4/2020  Small sclerotic focus in the lower sternal body is stable. No new  suspicious sclerotic or lytic lesions. Degenerative changes in the  spine. No significant interval change. Stable upper lobe ground-glass  opacities.    Bone density performed 8/20/2018 normal findings.    Patient had chronic back and hip  pain with associated neuropathy and neurogenic claudication, status post L2-L5 decompressive laminectomy secondary to severe spinal stenosis 6/4/2021,   also history of epidural injections-pain stable.  She  "uses a cane when going outside and a walker at home \"preventatively.\"  Patient has a history of lung nodules, seen  by Dr. Abreu 5/2020 noting stable imaging with follow-up recommended with him in 12 months, noted prior breast cancer with adjuvant radiation-7/2021.    She has been on oral B12 for several years, which she takes about once a week, never on injections, recommended by her PCP.       HISTORY OF PRESENT ILLNESS     ID Statement: Ashli Justice is a 73 years old female     Chief Complaint: Breast Cancer Surveillance/MGUS    Interval History:   Ashli presents today for follow-up for breast cancer surveillance and MGUS.  Generally she has no complaints.  She noted she was fatigued a lot but she has decreased her gabapentin to once daily.  Will have diarrhea with certain food, specific lactacid intolerance. She does have neuropathy in her legs and hands.  Her legs have chronic peripheral vascular disease and she has followed with vascular.  The majority of this is controlled by compression stocking.  She reports there is no infection today.    No fevers, hot flashes, illness or infection, unintentional weight loss, headaches, sore throat, acute vision changes, chest pain, change  in chronic leg edema, shortness breath, cough unless she has postnasal drip, nausea or vomiting, abnormal bowel movements, abdominal pain, blood in stool, dysuria or hematuria, pain or neurologic symptoms except as above, rashes or lumps now except chronic stasis dermatitis, abnormal bruising or bleeding, diabetes, anxiety  or depression, breast masses, vaginal discharge or bleeding.   PAST/CURRENT HISTORY     MEDICAL/SURGICAL HISTORY  -Breast cancer as above   -ID  -6/2023 monoclonal gammopathy IgG kappa 0.2   -June 2023 AUDREY +1: 320   -Carpal tunnel syndrome   -Glucose intolerance with hemoglobin A1c 5.9 in July 2023 with metformin prescribed but patient stopped due to insomnia  -Hypertension  -Positive Cologuard with colonoscopy " 9/30/2021 by Dr. Bryan showing a rectosigmoid polyp which was a hyperplastic polyp, no further endoscopy recommended given her advanced age  -Dyslipidemia   -Vitamin D deficiency  -Pulmonary nodules noted 4/2015.  8/2017 PET scan showed mild diffuse hypermetabolic activity throughout the tissue and muscular structures, groundglass nodular density on CT not evidence of but noted small size, mild activity  right adrenal.  5/2020 CT chest showed stable interstitial changes left anterior upper lobe could be from previous radiation, stable 1.1 cm left apex groundglass opacity, 4 mm faint groundglass opacity right upper lobe stable, emphysematous changes,  atelectasis lung bases, no new suspicious nodules, stable pleural thickening and scarring right lower lobe, probable right adrenal adenoma, stable sclerotic focus lower sternal body; overall no significant change with stable upper lobe groundglass opacities.   -Restless leg syndrome  -B12: At her appointment on 7/1/2021 patient was taking oral B12 weekly, never injections, started a few years prior at the recommendation of her PCP   -Lumbar/cervical radiculitis with a history of epidural injections, feet neuropathy, neurogenic claudication, osteoarthritis, sciatica, spinal stenosis-status post back surgery   -Trigger finger mentioned in the chart but patient denied on 7/1/2021  -Hypothyroidism, started on medication 11/2020  -Lymphedema lower extremities  -Carotid disease  -Abnormal  LFTs with transaminases up to the 70s in 2012 and 60s in 2015 but subsequently normal as of 6/2020  -Cellulitis of the lower extremities   -Venous insufficiency, stasis dermatitis  -Fatty liver on imaging 2015  -Positive stool occult with previous colon polyps (hyperplastic polyp 2005) and colonoscopy 7/2008 with small internal hemorrhoids and diverticula otherwise unremarkable.  -Blood loss anemia with hemoglobin down to  9.5 6/2021 after back surgery  -6/2021 apnea during back surgery with  sleep apnea evaluation recommended but patient declined 6/2021   -Former smoker  -glaucoma and dry eyes  -Lower extremity cellulitis/Vituz treated with doxycycline by PCP September 2023 and November 2023  -Rheumatology evaluation in 11/2023 as above     Past surgical history  Breast as above, D&C, bladder sling, tubal ligation, appendectomy, left total hip 4/2015,  laser eye surgery, cataract surgery, colonoscopies as above, L2-L5 decompressive laminectomy on 6/4/2021, laser eye surgery    SOCIAL HISTORY  -Lives Alone  -Work place: retired   -Tobacco/smokeless use: Smoked for 30 years, 1.5-2  packs/day, quit at age 46.   -Alcohol: Denies   -Illicit drug or marijuana use: Denies   -Scientology or Spiritual beliefs: None reported  -Social Determinates of Health Concerns: None reported     FAMILY HISTORY  -No other known history of hematologic, bleeding, clotting, autoimmune, genetic, or malignant disorders in the family.     OCCUPATIONAL/ENVIRONMENTAL HISTORY/EXPOSURES:  -None reported    Active Problems, Allergy List, Medication List, and PMH/PSH/FH/Social Hx have been reviewed and reconciled in chart. Updates made when necessary.     REVIEW OF SYSTEMS   A review of systems has been completed and are negative for complaints except what is stated in the assessment, HPI, IH, ROS, and/or past medical history.    ALLERGIES AND MEDICATIONS     Allergies and Intolerances:   Allergies   Allergen Reactions    Milk Diarrhea      Medication Profile:   Current Outpatient Medications   Medication Instructions    acetaminophen (TYLENOL) 325 mg, oral, Every 6 hours PRN    azilsartan-chlorthalidone (Edarbyclor) 40-25 mg tablet tablet 1 tablet, oral, Daily    brimonidine (AlphaGAN P) 0.2 % ophthalmic solution     carvedilol (COREG) 25 mg, oral, Daily    cholecalciferol (VITAMIN D-3) 125 mcg, oral, Daily    cyanocobalamin (Vitamin B-12) 1,000 mcg tablet 1 tablet, oral, Daily    dorzolamide-timoloL (Cosopt) 22.3-6.8 mg/mL ophthalmic  "solution ophthalmic (eye), 2 times daily    gabapentin (NEURONTIN) 300 mg, oral, Daily    latanoprost (Xalatan) 0.005 % ophthalmic solution ophthalmic (eye)    levothyroxine (SYNTHROID, LEVOXYL) 50 mcg, oral, Daily    omega 3-dha-epa-fish oil (Fish OiL) 1,000 mg (120 mg-180 mg) capsule 1,000 mg, oral, Daily    pramipexole (Mirapex) 1.5 mg tablet TAKE ONE-HALF (1/2) TABLET DAILY AT BEDTIME    rosuvastatin (CRESTOR) 10 mg, oral, Daily    topiramate (TOPAMAX) 25 mg, oral, 2 times daily    topiramate (TOPAMAX) 25 mg, oral, 2 times daily      Available Vaccination Record:   Immunization History   Administered Date(s) Administered    Flu vaccine, quadrivalent, high-dose, preservative free, age 65y+ (FLUZONE) 09/25/2023    Influenza, High Dose Seasonal, Preservative Free 09/13/2015, 11/04/2016, 09/01/2017, 10/09/2018, 11/18/2019, 10/12/2020, 09/29/2021, 10/05/2022    Influenza, Unspecified 10/06/2013    Influenza, seasonal, injectable 09/05/2012, 08/17/2014    Pfizer Gray Cap SARS-CoV-2 07/19/2022    Pfizer Purple Cap SARS-CoV-2 03/02/2021, 03/23/2021, 10/15/2021    Pneumococcal conjugate vaccine, 13-valent (PREVNAR 13) 04/03/2018    Pneumococcal polysaccharide vaccine, 23-valent, age 2 years and older (PNEUMOVAX 23) 08/12/2019    Zoster vaccine, recombinant, adult (SHINGRIX) 06/01/2019, 08/01/2019    Zoster, live 07/01/2011      PHYSICAL EXAM     Vital Signs/Measurements:       11/13/2023     1:01 PM 11/14/2023     2:41 PM 12/14/2023    12:36 PM 12/21/2023     1:13 PM 12/21/2023     1:34 PM 4/4/2024     1:00 PM 4/8/2024    11:03 AM   Vitals   Systolic 133 138 147 140 130 130 139   Diastolic 82 62 71 78 70 60 81   Heart Rate 52 61 56 70  58 54   Temp  36.3 °C (97.3 °F) 36.4 °C (97.5 °F) 36.3 °C (97.3 °F)  36.2 °C (97.1 °F) 36.2 °C (97.2 °F)   Resp   18    16   Height (in) 1.6 m (5' 3\")  1.6 m (5' 3\") 1.575 m (5' 2\")   1.575 m (5' 2\")   Weight (lb) 219.8 221.8 220.46 224  225 223.77   BMI 38.94 kg/m2 39.29 kg/m2 39.05 kg/m2 " 40.97 kg/m2  41.15 kg/m2 40.93 kg/m2   BSA (m2) 2.11 m2 2.12 m2 2.11 m2 2.11 m2  2.11 m2 2.11 m2   Visit Report Report Report Report Report Report Report Report        Performance:   ECOG Performance Status: 1     Grade ECOG performance status   0 Fully active, able to carry on all pre-disease performance without restriction   1 Restricted in physically strenuous activity but ambulatory and able to carry out work of a light or sedentary nature, e.g., light housework, office work   2 Ambulatory and capable of all selfcare but unable to carry out any work activities; Up and about more than 50% of waking hours   3 Capable of only limited selfcare, confined to bed or chair more than 50% of waking hours   4 Completely disabled; Cannot carry out any selfcare; Totally confined to bed or chair   5 Dead     Physical Exam:  General: Patient is awake/alert/oriented x3, no distress   Psychological: Intact recent and remote memory, judgement, and insight. Appropriate mood, affect, and behavior     RESULTS/DATA     Labs:     Lab Results   Component Value Date    WBC 9.0 06/18/2024    NEUTROABS 5.99 (H) 06/18/2024    IGABSOL 0.03 06/18/2024    LYMPHSABS 2.15 06/18/2024    MONOSABS 0.63 06/18/2024    EOSABS 0.11 06/18/2024    BASOSABS 0.06 06/18/2024    RBC 4.19 06/18/2024    MCV 95 06/18/2024    MCHC 31.2 (L) 06/18/2024    HGB 12.4 06/18/2024    HCT 39.7 06/18/2024     06/18/2024     Lab Results   Component Value Date    RETICCTPCT 2.2 (H) 03/31/2022      Lab Results   Component Value Date    CREATININE 0.96 06/18/2024    BUN 33 (H) 06/18/2024    EGFR 62 06/18/2024     06/18/2024    K 4.0 06/18/2024     06/18/2024    CO2 25 06/18/2024      Lab Results   Component Value Date    ALT 23 06/18/2024    AST 22 06/18/2024    ALKPHOS 91 06/18/2024    BILITOT 0.3 06/18/2024      Lab Results   Component Value Date    TSH 3.87 06/06/2023     Lab Results   Component Value Date    TSH 3.87 06/06/2023    THYROIDPAB <28  11/14/2023     Lab Results   Component Value Date    IRON 38 06/18/2024    TIBC 334 06/18/2024    FERRITIN 74 06/18/2024      Lab Results   Component Value Date    RWCQKWTR30 575 06/06/2023      Lab Results   Component Value Date    FOLATE >24.0 06/06/2023     Lab Results   Component Value Date    AUDREY Positive (A) 11/14/2023    RF 12 11/14/2023    SEDRATE 23 06/06/2023     Lab Results   Component Value Date     04/05/2024       Lab Results   Component Value Date    SPEP Aberrant band detected. See immunofixation. 06/18/2024     Lab Results   Component Value Date     06/18/2024    IGM 82 06/18/2024     06/18/2024        Radiology/Studies:   Please see above    ASSESSMENT/PLAN     Assessment and Plan:   #1.  Left breast  invasive ductal carcinoma grade 1 on final pathology, ER/IN positive, HER-2/vijay negative, also intermediate grade DCIS, 0.5 cm, lymph nodes negative, [id number], status post lumpectomy 9/2012, adjuvant radiation completed 11/2012, and 5 years of aromatase  inhibitor (tamoxifen for 9 months transition to letrozole given joint aches) completed around January 2018.    No evidence of disease although patient declined breast exam.  Negative hepatitis panel 8/2023.  Next mammogram due 12/20/2024.  NCCN guidelines discussed.  She will have mammogram December 23, 2024 and follow-up first week of January.    #2.  MGUS    #IgG kappa monoclonal gammopathy low level 0.2 in June 2023 confirmed July 2023 with no hypercalcemia, anemia, renal failure, bony lesions related to this, 8/30/2023 PET scan unremarkable except for hypermetabolism lower extremities felt to be cellulitis and she was treated for cellulitis by PCP with symptoms improved, 8/2023 normal LDH and negative spot and 24-hour UPEP with mildly elevated kappa light chain but normal ratio and lambda with IgG kappa 0.1 and normal immunoglobulins, so far appears to be MGUS, could be related to an undiagnosed rheumatologic condition given   her positive AUDREY June 2023 although seen by rheumatology 11/2023 and other panel was negative felt not to have an inflammatory arthritis.    Monoclonal gammopathy was found in the setting of chronic pain with neuropathy which does not appear related to her monoclonal gammopathy but rather her back issues and possibly due to diabetes.    ~Possible carpal tunnel syndrome July 2023 for which patient declined surgery    ~History of colon polyps with colonoscopy 2008 recommending follow-up in 5 years.  7/1/2021 patient declined further colonoscopies but a Cologuard from PCP prompting a colonoscopy 9/30/2021 by Dr. Bryan showing a hyperplastic rectosigmoid polyp; he recommended no further follow-up given her advanced age.     MGUS labs will be drawn December 2024.  Patient will follow first week of January 2025.      #3.  Pulmonary nodules noted 4/2015.    8/2017 PET scan showed mild diffuse hypermetabolic activity throughout the tissue and  muscular structures, groundglass nodular density on CT not evident but noted small size, mild activity right adrenal.  5/2020  CT chest showed stable interstitial changes left anterior upper lobe could be from previous radiation, stable 1.1 cm left apex groundglass opacity, 4 mm faint groundglass opacity right upper lobe stable, emphysematous changes, atelectasis lung bases,  no new suspicious nodules, stable pleural thickening and scarring right lower lobe, probable right adrenal adenoma, stable sclerotic focus lower sternal body; overall no significant change with stable upper lobe groundglass opacities. Pt was seen by CT  surgery 5/2020 most recently recommending follow-up in 1 year with a CT. 7/1/2021 patient declined further CT surgery follow-up as well as CT scans of the chest as she felt that her findings were  stable- Dr Parada did explain she could have a malignancy that might be missed.    4/8/2024-she agreed to a CT scan if it can be performed at [place].  This has been  ordered.     ~7/2023 chest x-ray unremarkable.  PET scan showed no significant lung lesions 8/30/2023.     ~Vitamin D deficiency, low 8/2022 status post supplement.  Patient declined further labs from me regarding this.       ~Lymphedema of the lower extremities, controlled with compression stockings.  Assessment likely peripheral vascular disease.     ~Fatty liver noted on ultrasound from 2/24/2015     ~Back/hip pain with neurogenic claudication status post back surgery 6/2021 , symptoms not significantly better after surgery.     ~6/2021 postoperative anemia likely from blood loss, leukocytosis, renal insufficiency, hypocalcemia.  Labs better on follow-up.  Normal CBC November 2023.    ~4/8/2024 CBC fairly unremarkable other than neutrophil absolute count 6.37 transient elevation over the years.   ~7/8/2024 similar findings on CT patient will follow primary for further evaluation.    #4.  Mild Anemia and Borderline B12  7/2021 mild anemia hemoglobin 11s might have been residual from her surgery as well as CKD, also thrombocytopenia at 114 but recheck to 255 so possible lab error.  3/2022 normal CBC, normal LDH, reticulocyte count 2.2%, iron 19% with ferritin 78 which  was rechecked in August 2022 and normal, normal folate March 2022.  8/2022 normal CBC, normal iron studies.  June 2023 normal CBC.  Normal CBC November 2023.  November 2023 MCV 99 somewhat borderline but still within normal limits, 6/2023 normal B12 and folate, can be monitored periodically with further evaluation depending on trend.  4/8/2024- B12 pending along with MGUS labs.  Notable GFR 56, normal LFTs.  No elevation in calcium.  Patient does have chronic neuropathy, PVD also contributory.     7/8/2024 there is some iron deficiency noted patient will start a low-dose oral iron with vitamin C she will obtain this over-the-counter.  She will call if she is unable to tolerate.  B12 will be assessed next visit.         **Please follow with  specialties as scheduled for other comorbidities and routine health screenings.**    I have reviewed the patient's medical record including provider notes, laboratory and testing results, imaging, and procedures available within the system and outside the system pertinent to patient care.     Follow up:    RTC:  -Follow-up visit 3 months with labs    Medications:  -Low-dose oral iron with vitamin C    Imaging/Testing:  -Mammogram due 12/24/2024    Referral:  -NA    Other Pertinent Appointments:  -PCP 7/8/2024  -Ophthalmology 8/1/2024      Patient Discussion Summary:  Discussed plan of care in detail. Patient states understanding and in agreement. Answered all questions. They will call with any additional questions and/or concerns.    Thank you for allowing me to participate in your care.    Sincerely,  Beena Lacey, APRN-CNP       This document may have been written by voice recognition software.  Please request clarification if there is documentation error or clarification is needed.   Time based billing: Please see documentation within this specific encounter.

## 2024-07-08 NOTE — PROGRESS NOTES
"Subjective   Patient ID: Ashli Justice is a 73 y.o. female who presents for Hyperlipidemia, Hypertension, and Hypothyroidism.    -- Chronic bilateral lower extremity lymphedema  Patient comes about daily dressing with Aloe Vesta 3 continue with compression hose keep the legs elevated, doing much better  - Underlying restless leg syndrome symptoms worsening recent iron studies with hematology showed iron deficiency anemia patient need to start on ferrous sulfate 324 mg daily follow-up CBC results  Blood draw  -History of lumbosacral surgery and chronic neuropathy  Patient to continue gabapentin 300 mg at bedtime  topiramate 25 mg 1  tablets twice a day patient, compensated  - Patient seen by oncology underlying [facility] follow-up closely stable normal results repeat blood work as a scheduled in 6 months  -History of breast cancer remission follow-up hematology oncology, no recurrence  -- Bilateral carpal tunnel syndrome treated conservatively declined surgical intervention at this time  - Underlying sleep apnea continue with fatigue and tiredness and weight gain declined CPAP declined evaluation by sleep clinic  Morbid obesity  -\" Obesity patient now BMI 39 lost about 10 pounds since last time continue topiramate 50 mg twice a day continue on 1000-calorie reduction  -Hypertension, improving, counseled about weight loss  -Hypothyroid controlled  -Lymphedema continue with continuous wrapping per lymphedema clinic as needed doing much better  -Lumbosacral disease cut down gabapentin take it only at bedtime  -Carotid atherosclerosis ultrasound not changed less than 50% no TIA continue on aspirin no recurrent symptoms  Follow-up 3 months      Hyperlipidemia  Pertinent negatives include no chest pain.   Hypertension  Pertinent negatives include no chest pain, headaches or palpitations.          Review of Systems   Constitutional:  Negative for fatigue, fever and unexpected weight change.   HENT:  Negative for congestion, ear " discharge, ear pain, mouth sores, sinus pain and sore throat.    Eyes:  Negative for visual disturbance.   Respiratory:  Negative for cough and wheezing.    Cardiovascular:  Negative for chest pain, palpitations and leg swelling.   Gastrointestinal:  Negative for abdominal pain, blood in stool and diarrhea.   Genitourinary:  Negative for difficulty urinating.   Musculoskeletal:  Negative for arthralgias.   Skin:  Negative for rash.   Neurological:  Negative for dizziness and headaches.   Hematological:  Does not bruise/bleed easily.   Psychiatric/Behavioral:  Negative for behavioral problems.    All other systems reviewed and are negative.      Objective   Lab Results   Component Value Date    HGBA1C 5.9 (A) 06/06/2023      /70   Pulse 66   Temp 36.4 °C (97.6 °F)   Wt 103 kg (227 lb 3.2 oz)   SpO2 93%   BMI 41.56 kg/m²     Physical Exam  Vitals and nursing note reviewed.   Constitutional:       Appearance: Normal appearance.   HENT:      Head: Normocephalic.      Nose: Nose normal.   Eyes:      Conjunctiva/sclera: Conjunctivae normal.      Pupils: Pupils are equal, round, and reactive to light.   Cardiovascular:      Rate and Rhythm: Regular rhythm.   Pulmonary:      Effort: Pulmonary effort is normal.      Breath sounds: Normal breath sounds.   Abdominal:      General: Abdomen is flat.      Palpations: Abdomen is soft.   Musculoskeletal:      Cervical back: Neck supple.      Right lower leg: Edema present.      Left lower leg: Edema present.   Skin:     General: Skin is warm.   Neurological:      General: No focal deficit present.      Mental Status: She is oriented to person, place, and time.   Psychiatric:         Mood and Affect: Mood normal.         Assessment/Plan   Ashli was seen today for hyperlipidemia, hypertension and hypothyroidism.  Diagnoses and all orders for this visit:  Iron deficiency anemia, unspecified iron deficiency anemia type (Primary)  -     ferrous sulfate 325 (65 Fe) MG EC  "tablet; Take 1 tablet by mouth once daily with breakfast. Do not crush, chew, or split.  Hypothyroidism, unspecified type  -     levothyroxine (Synthroid, Levoxyl) 50 mcg tablet; Take 1 tablet (50 mcg) by mouth once daily.  Benign essential hypertension  -     carvedilol (Coreg) 25 mg tablet; Take 1 tablet (25 mg) by mouth once daily.  MGUS (monoclonal gammopathy of unknown significance)  Lumbar degenerative disc disease  Restless legs   -- Chronic bilateral lower extremity lymphedema  Patient comes about daily dressing with Aloe Vesta 3 continue with compression hose keep the legs elevated, doing much better  - Underlying restless leg syndrome symptoms worsening recent iron studies with hematology showed iron deficiency anemia patient need to start on ferrous sulfate 324 mg daily follow-up CBC results  Blood draw  -History of lumbosacral surgery and chronic neuropathy  Patient to continue gabapentin 300 mg at bedtime  topiramate 25 mg 1  tablets twice a day patient, compensated  - Patient seen by oncology underlying [facility] follow-up closely stable normal results repeat blood work as a scheduled in 6 months  -History of breast cancer remission follow-up hematology oncology, no recurrence  -- Bilateral carpal tunnel syndrome treated conservatively declined surgical intervention at this time  - Underlying sleep apnea continue with fatigue and tiredness and weight gain declined CPAP declined evaluation by sleep clinic  Morbid obesity  -\" Obesity patient now BMI 39 lost about 10 pounds since last time continue topiramate 50 mg twice a day continue on 1000-calorie reduction  -Hypertension, improving, counseled about weight loss  -Hypothyroid controlled  -Lymphedema continue with continuous wrapping per lymphedema clinic as needed doing much better  -Lumbosacral disease cut down gabapentin take it only at bedtime  -Carotid atherosclerosis ultrasound not changed less than 50% no TIA continue on aspirin no recurrent " symptoms  Follow-up 3 months

## 2024-07-09 DIAGNOSIS — G62.9 NEUROPATHY: ICD-10-CM

## 2024-07-09 RX ORDER — TOPIRAMATE 25 MG/1
25 TABLET ORAL 2 TIMES DAILY
Qty: 180 TABLET | Refills: 1 | Status: SHIPPED | OUTPATIENT
Start: 2024-07-09 | End: 2025-07-09

## 2024-10-07 DIAGNOSIS — E78.00 HYPERCHOLESTEROLEMIA: ICD-10-CM

## 2024-10-07 RX ORDER — ROSUVASTATIN CALCIUM 10 MG/1
10 TABLET, COATED ORAL DAILY
Qty: 90 TABLET | Refills: 0 | Status: SHIPPED | OUTPATIENT
Start: 2024-10-07

## 2024-10-08 ENCOUNTER — APPOINTMENT (OUTPATIENT)
Dept: PRIMARY CARE | Facility: CLINIC | Age: 74
End: 2024-10-08
Payer: MEDICARE

## 2024-10-08 VITALS
DIASTOLIC BLOOD PRESSURE: 60 MMHG | BODY MASS INDEX: 41.19 KG/M2 | HEART RATE: 60 BPM | OXYGEN SATURATION: 94 % | WEIGHT: 225.2 LBS | SYSTOLIC BLOOD PRESSURE: 148 MMHG | TEMPERATURE: 96.7 F

## 2024-10-08 DIAGNOSIS — L30.9 DERMATITIS: Primary | ICD-10-CM

## 2024-10-08 DIAGNOSIS — L21.9 SEBORRHEIC DERMATITIS OF SCALP: ICD-10-CM

## 2024-10-08 DIAGNOSIS — I10 BENIGN ESSENTIAL HYPERTENSION: ICD-10-CM

## 2024-10-08 DIAGNOSIS — E03.9 HYPOTHYROIDISM, UNSPECIFIED TYPE: ICD-10-CM

## 2024-10-08 DIAGNOSIS — Z23 FLU VACCINE NEED: ICD-10-CM

## 2024-10-08 DIAGNOSIS — D50.9 IRON DEFICIENCY ANEMIA, UNSPECIFIED IRON DEFICIENCY ANEMIA TYPE: ICD-10-CM

## 2024-10-08 DIAGNOSIS — E78.00 HYPERCHOLESTEROLEMIA: ICD-10-CM

## 2024-10-08 PROCEDURE — 3077F SYST BP >= 140 MM HG: CPT | Performed by: INTERNAL MEDICINE

## 2024-10-08 PROCEDURE — 90662 IIV NO PRSV INCREASED AG IM: CPT | Performed by: INTERNAL MEDICINE

## 2024-10-08 PROCEDURE — 1160F RVW MEDS BY RX/DR IN RCRD: CPT | Performed by: INTERNAL MEDICINE

## 2024-10-08 PROCEDURE — 1036F TOBACCO NON-USER: CPT | Performed by: INTERNAL MEDICINE

## 2024-10-08 PROCEDURE — G0008 ADMIN INFLUENZA VIRUS VAC: HCPCS | Performed by: INTERNAL MEDICINE

## 2024-10-08 PROCEDURE — 3078F DIAST BP <80 MM HG: CPT | Performed by: INTERNAL MEDICINE

## 2024-10-08 PROCEDURE — 1159F MED LIST DOCD IN RCRD: CPT | Performed by: INTERNAL MEDICINE

## 2024-10-08 PROCEDURE — 1157F ADVNC CARE PLAN IN RCRD: CPT | Performed by: INTERNAL MEDICINE

## 2024-10-08 PROCEDURE — 99214 OFFICE O/P EST MOD 30 MIN: CPT | Performed by: INTERNAL MEDICINE

## 2024-10-08 RX ORDER — CICLOPIROX 1 G/100ML
SHAMPOO TOPICAL
Qty: 120 ML | Refills: 0 | Status: SHIPPED | OUTPATIENT
Start: 2024-10-08

## 2024-10-08 RX ORDER — CICLOPIROX OLAMINE 7.7 MG/G
CREAM TOPICAL 2 TIMES DAILY
Qty: 90 G | Refills: 0 | Status: SHIPPED | OUTPATIENT
Start: 2024-10-08 | End: 2024-10-08 | Stop reason: ENTERED-IN-ERROR

## 2024-10-08 ASSESSMENT — ENCOUNTER SYMPTOMS
COUGH: 0
ARTHRALGIAS: 0
BRUISES/BLEEDS EASILY: 0
ABDOMINAL PAIN: 0
FEVER: 0
BLOOD IN STOOL: 0
DIFFICULTY URINATING: 0
HYPERTENSION: 1
DIARRHEA: 0
SORE THROAT: 0
UNEXPECTED WEIGHT CHANGE: 0
SINUS PAIN: 0
PALPITATIONS: 0
HEADACHES: 0
FATIGUE: 0
WHEEZING: 0
DIZZINESS: 0

## 2024-10-08 NOTE — PROGRESS NOTES
"Subjective   Patient ID: Ashli Justice is a 73 y.o. female who presents for Anemia, Hypertension, Immunizations (Flu inj given), and Dry skin (On scalp and at base of hair line at nape of neck).     -Flu vaccine given today  - Examination scalp seborrheic dermatitis patient counseled about ciclopirox shampoo 2-3 times a week follow-up results closely  - Chronic bilateral lower extremity lymphedema compensated  Patient comes about daily dressing with Aloe Vesta 3 continue with compression hose keep the legs elevated, doing much better  - Underlying restless leg syndrome symptoms worsening recent iron studies with hematology showed iron deficiency anemia patient need to start on ferrous sulfate 324 mg daily follow-up CBC results  Blood draw  -History of lumbosacral surgery and chronic neuropathy.  Patient to continue gabapentin 300 mg at bedtime  topiramate 25 mg 1  tablets twice a day patient, compensated  - Patient seen by oncology underlying [facility] follow-up closely stable normal results repeat blood work as a scheduled in 6 months  -History of breast cancer remission follow-up hematology oncology, no recurrence  -- Bilateral carpal tunnel syndrome treated conservatively declined surgical intervention at this time  - Underlying sleep apnea continue with fatigue and tiredness and weight gain declined CPAP declined evaluation by sleep clinic  Morbid obesity  -\" Obesity patient now BMI 39 lost about 10 pounds since last time continue topiramate 50 mg twice a day continue on 1000-calorie reduction  -Hypertension, improving, counseled about weight loss  -Hypothyroid controlled  -Lymphedema continue with continuous wrapping per lymphedema clinic as needed doing much better  -Lumbosacral disease cut down gabapentin take it only at bedtime  -Carotid atherosclerosis ultrasound not changed less than 50% no TIA continue on aspirin no recurrent symptoms  Follow-up 3 months         Anemia  There has been no abdominal pain, " bruising/bleeding easily, fever or palpitations.   Hypertension  Pertinent negatives include no chest pain, headaches or palpitations.          Review of Systems   Constitutional:  Negative for fatigue, fever and unexpected weight change.   HENT:  Negative for congestion, ear discharge, ear pain, mouth sores, sinus pain and sore throat.    Eyes:  Negative for visual disturbance.   Respiratory:  Negative for cough and wheezing.    Cardiovascular:  Negative for chest pain, palpitations and leg swelling.   Gastrointestinal:  Negative for abdominal pain, blood in stool and diarrhea.   Genitourinary:  Negative for difficulty urinating.   Musculoskeletal:  Negative for arthralgias.   Skin:  Positive for rash.   Neurological:  Negative for dizziness and headaches.   Hematological:  Does not bruise/bleed easily.   Psychiatric/Behavioral:  Negative for behavioral problems.    All other systems reviewed and are negative.      Objective   Lab Results   Component Value Date    HGBA1C 5.9 (A) 06/06/2023      /60   Pulse 60   Temp 35.9 °C (96.7 °F)   Wt 102 kg (225 lb 3.2 oz)   SpO2 94%   BMI 41.19 kg/m²   Lab Results   Component Value Date    WBC 9.0 06/18/2024    HGB 12.4 06/18/2024    HCT 39.7 06/18/2024     06/18/2024    CHOL 126 07/12/2021    TRIG 226 (H) 07/12/2021    HDL 40.0 07/12/2021    ALT 23 06/18/2024    AST 22 06/18/2024     06/18/2024    K 4.0 06/18/2024     06/18/2024    CREATININE 0.96 06/18/2024    BUN 33 (H) 06/18/2024    CO2 25 06/18/2024    TSH 3.87 06/06/2023    INR 1.1 05/19/2021    HGBA1C 5.9 (A) 06/06/2023     par   Physical Exam  Vitals and nursing note reviewed.   Constitutional:       Appearance: Normal appearance.   HENT:      Head: Normocephalic.      Nose: Nose normal.   Eyes:      Conjunctiva/sclera: Conjunctivae normal.      Pupils: Pupils are equal, round, and reactive to light.   Cardiovascular:      Rate and Rhythm: Regular rhythm.   Pulmonary:      Effort:  Pulmonary effort is normal.      Breath sounds: Normal breath sounds.   Abdominal:      General: Abdomen is flat.      Palpations: Abdomen is soft.   Musculoskeletal:      Cervical back: Neck supple.   Skin:     General: Skin is warm.      Findings: Rash (Scalp seborrheic dermatitis) present.   Neurological:      General: No focal deficit present.      Mental Status: She is oriented to person, place, and time.   Psychiatric:         Mood and Affect: Mood normal.         Assessment/Plan   Ashli was seen today for anemia, hypertension, immunizations and dry skin.  Diagnoses and all orders for this visit:  Dermatitis (Primary)  -     Discontinue: ciclopirox (Loprox) 0.77 % cream; Apply topically 2 times a day.  Flu vaccine need  -     Flu vaccine, trivalent, preservative free, HIGH-DOSE, age 65y+ (Fluzone)  Seborrheic dermatitis of scalp  -     ciclopirox 1 % shampoo; Apply shampoo to the scalp 3 times a week as a  Hypothyroidism, unspecified type  Iron deficiency anemia, unspecified iron deficiency anemia type  Benign essential hypertension  Hypercholesterolemia   -Flu vaccine given today  - Examination scalp seborrheic dermatitis patient counseled about ciclopirox shampoo 2-3 times a week follow-up results closely  - Chronic bilateral lower extremity lymphedema compensated  Patient comes about daily dressing with Aloe Vesta 3 continue with compression hose keep the legs elevated, doing much better  - Underlying restless leg syndrome symptoms worsening recent iron studies with hematology showed iron deficiency anemia patient need to start on ferrous sulfate 324 mg daily follow-up CBC results  Blood draw  -History of lumbosacral surgery and chronic neuropathy.  Patient to continue gabapentin 300 mg at bedtime  topiramate 25 mg 1  tablets twice a day patient, compensated  - [facility] stable normal results repeat blood work as a scheduled in 6 months  -History of breast  "cancer remission follow-up hematology oncology, no recurrence  -- Bilateral carpal tunnel syndrome treated conservatively declined surgical intervention at this time  - Underlying sleep apnea continue with fatigue and tiredness and weight gain declined CPAP declined evaluation by sleep clinic  Morbid obesity  -\" Obesity patient now BMI 39 lost about 10 pounds since last time continue topiramate 50 mg twice a day continue on 1000-calorie reduction  -Hypertension, improving, counseled about weight loss  -Hypothyroid controlled  -Lymphedema continue with continuous wrapping per lymphedema clinic as needed doing much better  -Lumbosacral disease cut down gabapentin take it only at bedtime  -Carotid atherosclerosis ultrasound not changed less than 50% no TIA continue on aspirin no recurrent symptoms  Follow-up 3 months       "

## 2024-10-30 DIAGNOSIS — M51.369 LUMBAR DEGENERATIVE DISC DISEASE: ICD-10-CM

## 2024-10-30 DIAGNOSIS — E55.9 VITAMIN D DEFICIENCY: ICD-10-CM

## 2024-10-30 RX ORDER — VIT C/E/ZN/COPPR/LUTEIN/ZEAXAN 250MG-90MG
125 CAPSULE ORAL DAILY
Qty: 90 CAPSULE | Refills: 1 | Status: SHIPPED | OUTPATIENT
Start: 2024-10-30

## 2024-10-30 RX ORDER — GABAPENTIN 300 MG/1
300 CAPSULE ORAL DAILY
Qty: 90 CAPSULE | Refills: 0 | Status: SHIPPED | OUTPATIENT
Start: 2024-10-30 | End: 2025-01-28

## 2024-11-26 ENCOUNTER — TELEPHONE (OUTPATIENT)
Dept: HEMATOLOGY/ONCOLOGY | Facility: HOSPITAL | Age: 74
End: 2024-11-26
Payer: MEDICARE

## 2024-11-26 NOTE — TELEPHONE ENCOUNTER
Patient is currently scheduled to see MIGUELANGEL Castle on Tues, 1/7/25. Provider will be leaving [facility] and going to [facility] full time. Reached out to patient to transfer care to Dr. Camarillo. Patient agreeable.  Rescheduled to Wednes, 1/15/25 at 1 PM. Patient verbalized understanding and agreement regarding discussed information via verbal feedback.

## 2024-12-18 DIAGNOSIS — E78.00 HYPERCHOLESTEROLEMIA: ICD-10-CM

## 2024-12-18 RX ORDER — ROSUVASTATIN CALCIUM 10 MG/1
10 TABLET, COATED ORAL DAILY
Qty: 90 TABLET | Refills: 0 | Status: SHIPPED | OUTPATIENT
Start: 2024-12-18

## 2024-12-20 DIAGNOSIS — I10 BENIGN ESSENTIAL HYPERTENSION: ICD-10-CM

## 2024-12-23 ENCOUNTER — TELEPHONE (OUTPATIENT)
Dept: HEMATOLOGY/ONCOLOGY | Facility: HOSPITAL | Age: 74
End: 2024-12-23
Payer: MEDICARE

## 2024-12-23 ENCOUNTER — APPOINTMENT (OUTPATIENT)
Dept: RADIOLOGY | Facility: HOSPITAL | Age: 74
End: 2024-12-23
Payer: MEDICARE

## 2024-12-23 RX ORDER — CARVEDILOL 25 MG/1
25 TABLET ORAL DAILY
Qty: 90 TABLET | Refills: 1 | Status: SHIPPED | OUTPATIENT
Start: 2024-12-23

## 2024-12-23 NOTE — TELEPHONE ENCOUNTER
Patient scheduled for HEM ONC ESTABLISHED PATIENT VISIT with Dr. Camarillo on Wednesday, 1/15/25 at 1 PM. Patient called to reschedule appointment until after scheduled mammo. Mammo is currently scheduled for Monday, 3/3/25. Offered patient an appointment a week after mammo on 3/12 or 3/13. Patient requested to a later appointment. Rescheduled to next available afternoon appointment on Thursday, 3/27/25 at 1:20 PM. Patient verbalized understanding and agreement regarding discussed information via verbal feedback.

## 2024-12-28 DIAGNOSIS — E03.9 HYPOTHYROIDISM, UNSPECIFIED TYPE: ICD-10-CM

## 2024-12-30 RX ORDER — LEVOTHYROXINE SODIUM 50 UG/1
50 TABLET ORAL DAILY
Qty: 90 TABLET | Refills: 1 | Status: SHIPPED | OUTPATIENT
Start: 2024-12-30

## 2025-01-07 ENCOUNTER — APPOINTMENT (OUTPATIENT)
Dept: HEMATOLOGY/ONCOLOGY | Facility: HOSPITAL | Age: 75
End: 2025-01-07
Payer: MEDICARE

## 2025-01-13 ENCOUNTER — APPOINTMENT (OUTPATIENT)
Dept: PRIMARY CARE | Facility: CLINIC | Age: 75
End: 2025-01-13
Payer: MEDICARE

## 2025-01-14 DIAGNOSIS — G62.9 NEUROPATHY: ICD-10-CM

## 2025-01-14 RX ORDER — TOPIRAMATE 25 MG/1
25 TABLET ORAL 2 TIMES DAILY
Qty: 180 TABLET | Refills: 0 | Status: SHIPPED | OUTPATIENT
Start: 2025-01-14

## 2025-01-15 ENCOUNTER — APPOINTMENT (OUTPATIENT)
Dept: HEMATOLOGY/ONCOLOGY | Facility: HOSPITAL | Age: 75
End: 2025-01-15
Payer: MEDICARE

## 2025-01-16 ENCOUNTER — APPOINTMENT (OUTPATIENT)
Dept: PRIMARY CARE | Facility: CLINIC | Age: 75
End: 2025-01-16
Payer: MEDICARE

## 2025-01-28 DIAGNOSIS — M51.369 LUMBAR DEGENERATIVE DISC DISEASE: ICD-10-CM

## 2025-01-28 RX ORDER — GABAPENTIN 300 MG/1
300 CAPSULE ORAL DAILY
Qty: 90 CAPSULE | Refills: 0 | Status: SHIPPED | OUTPATIENT
Start: 2025-01-28 | End: 2025-04-28

## 2025-01-29 ENCOUNTER — APPOINTMENT (OUTPATIENT)
Dept: PRIMARY CARE | Facility: CLINIC | Age: 75
End: 2025-01-29
Payer: MEDICARE

## 2025-01-30 ENCOUNTER — APPOINTMENT (OUTPATIENT)
Dept: PRIMARY CARE | Facility: CLINIC | Age: 75
End: 2025-01-30
Payer: MEDICARE

## 2025-01-30 VITALS
SYSTOLIC BLOOD PRESSURE: 158 MMHG | HEART RATE: 76 BPM | WEIGHT: 225.8 LBS | TEMPERATURE: 97.4 F | HEIGHT: 62 IN | OXYGEN SATURATION: 95 % | BODY MASS INDEX: 41.55 KG/M2 | DIASTOLIC BLOOD PRESSURE: 82 MMHG

## 2025-01-30 DIAGNOSIS — E13.9 DIABETES 1.5, MANAGED AS TYPE 2 (MULTI): ICD-10-CM

## 2025-01-30 DIAGNOSIS — R79.9 ABNORMAL FINDING OF BLOOD CHEMISTRY, UNSPECIFIED: ICD-10-CM

## 2025-01-30 DIAGNOSIS — M48.07 SPINAL STENOSIS OF LUMBOSACRAL REGION: ICD-10-CM

## 2025-01-30 DIAGNOSIS — R53.83 OTHER FATIGUE: ICD-10-CM

## 2025-01-30 DIAGNOSIS — E78.00 HYPERCHOLESTEREMIA: ICD-10-CM

## 2025-01-30 DIAGNOSIS — I10 HYPERTENSION, UNSPECIFIED TYPE: ICD-10-CM

## 2025-01-30 DIAGNOSIS — Z79.899 HIGH RISK MEDICATION USE: ICD-10-CM

## 2025-01-30 DIAGNOSIS — E53.8 VITAMIN B12 DEFICIENCY: ICD-10-CM

## 2025-01-30 DIAGNOSIS — E55.9 VITAMIN D DEFICIENCY, UNSPECIFIED: ICD-10-CM

## 2025-01-30 DIAGNOSIS — L97.511 NON-PRESSURE CHRONIC ULCER OF OTHER PART OF RIGHT FOOT LIMITED TO BREAKDOWN OF SKIN: ICD-10-CM

## 2025-01-30 DIAGNOSIS — C50.912 MALIGNANT NEOPLASM OF LEFT FEMALE BREAST, UNSPECIFIED ESTROGEN RECEPTOR STATUS, UNSPECIFIED SITE OF BREAST: ICD-10-CM

## 2025-01-30 DIAGNOSIS — E55.9 VITAMIN D DEFICIENCY: ICD-10-CM

## 2025-01-30 DIAGNOSIS — Z00.00 ROUTINE GENERAL MEDICAL EXAMINATION AT HEALTH CARE FACILITY: Primary | ICD-10-CM

## 2025-01-30 DIAGNOSIS — N18.31 STAGE 3A CHRONIC KIDNEY DISEASE (MULTI): ICD-10-CM

## 2025-01-30 PROCEDURE — 1160F RVW MEDS BY RX/DR IN RCRD: CPT | Performed by: INTERNAL MEDICINE

## 2025-01-30 PROCEDURE — 1157F ADVNC CARE PLAN IN RCRD: CPT | Performed by: INTERNAL MEDICINE

## 2025-01-30 PROCEDURE — 1170F FXNL STATUS ASSESSED: CPT | Performed by: INTERNAL MEDICINE

## 2025-01-30 PROCEDURE — 1158F ADVNC CARE PLAN TLK DOCD: CPT | Performed by: INTERNAL MEDICINE

## 2025-01-30 PROCEDURE — 1159F MED LIST DOCD IN RCRD: CPT | Performed by: INTERNAL MEDICINE

## 2025-01-30 PROCEDURE — 3077F SYST BP >= 140 MM HG: CPT | Performed by: INTERNAL MEDICINE

## 2025-01-30 PROCEDURE — G0439 PPPS, SUBSEQ VISIT: HCPCS | Performed by: INTERNAL MEDICINE

## 2025-01-30 PROCEDURE — 1123F ACP DISCUSS/DSCN MKR DOCD: CPT | Performed by: INTERNAL MEDICINE

## 2025-01-30 PROCEDURE — 1036F TOBACCO NON-USER: CPT | Performed by: INTERNAL MEDICINE

## 2025-01-30 PROCEDURE — 99214 OFFICE O/P EST MOD 30 MIN: CPT | Performed by: INTERNAL MEDICINE

## 2025-01-30 PROCEDURE — 3079F DIAST BP 80-89 MM HG: CPT | Performed by: INTERNAL MEDICINE

## 2025-01-30 RX ORDER — VIT C/E/ZN/COPPR/LUTEIN/ZEAXAN 250MG-90MG
125 CAPSULE ORAL DAILY
Qty: 90 CAPSULE | Refills: 1 | Status: SHIPPED | OUTPATIENT
Start: 2025-01-30

## 2025-01-30 RX ORDER — TIZANIDINE HYDROCHLORIDE 2 MG/1
2 CAPSULE, GELATIN COATED ORAL 3 TIMES DAILY
Qty: 90 CAPSULE | Refills: 11 | Status: SHIPPED | OUTPATIENT
Start: 2025-01-30 | End: 2026-01-30

## 2025-01-30 RX ORDER — HYDRALAZINE HYDROCHLORIDE 10 MG/1
10 TABLET, FILM COATED ORAL 3 TIMES DAILY
Qty: 90 TABLET | Refills: 11 | Status: SHIPPED | OUTPATIENT
Start: 2025-01-30 | End: 2026-01-30

## 2025-01-30 RX ORDER — AZILSARTAN KAMEDOXOMIL AND CHLORTHALIDONE 40; 25 MG/1; MG/1
1 TABLET ORAL DAILY
Qty: 90 TABLET | Refills: 3 | Status: SHIPPED | OUTPATIENT
Start: 2025-01-30

## 2025-01-30 ASSESSMENT — ENCOUNTER SYMPTOMS
HYPERTENSION: 1
BRUISES/BLEEDS EASILY: 0
FATIGUE: 0
BLOOD IN STOOL: 0
HEADACHES: 0
WHEEZING: 0
DIZZINESS: 0
PALPITATIONS: 0
BACK PAIN: 1
FEVER: 0
ARTHRALGIAS: 0
UNEXPECTED WEIGHT CHANGE: 0
DIFFICULTY URINATING: 0
COUGH: 0
ABDOMINAL PAIN: 0
SINUS PAIN: 0
DIARRHEA: 0
SHORTNESS OF BREATH: 1
SORE THROAT: 0

## 2025-01-30 ASSESSMENT — ACTIVITIES OF DAILY LIVING (ADL)
GROCERY_SHOPPING: INDEPENDENT
DOING_HOUSEWORK: INDEPENDENT
BATHING: INDEPENDENT
TAKING_MEDICATION: INDEPENDENT
MANAGING_FINANCES: INDEPENDENT
DRESSING: INDEPENDENT

## 2025-01-30 NOTE — PROGRESS NOTES
"Subjective   Patient ID: Ashli Justice is a 74 y.o. female who presents for Medicare Annual Wellness Visit Subsequent, Vitamin D Deficiency, Hypertension, Med Refill, Shortness of Breath (With weight gain), and Back Pain (With shoveling snow it is worse).    Medicare Annual Wellness Visit Subsequent, Vitamin D Deficiency, Hypertension, Med Refill, Shortness of Breath (With weight gain), and Back Pain (With shoveling snow it is worse)        Vitamin Deficiency  Hypertension  Associated symptoms include shortness of breath.   Med Refill    Shortness of Breath    Back Pain           Review of Systems   Respiratory:  Positive for shortness of breath.    Musculoskeletal:  Positive for back pain.       Objective   Lab Results   Component Value Date    HGBA1C 5.9 (A) 06/06/2023      /82   Pulse 76   Temp 36.3 °C (97.4 °F)   Ht 1.575 m (5' 2\")   Wt 102 kg (225 lb 12.8 oz)   SpO2 95%   BMI 41.30 kg/m²     Physical Exam    Assessment/Plan   Ashli was seen today for medicare annual wellness visit subsequent, vitamin d deficiency, hypertension, med refill, shortness of breath and back pain.  Diagnoses and all orders for this visit:  Hypertension, unspecified type  Vitamin D deficiency     "

## 2025-01-30 NOTE — ASSESSMENT & PLAN NOTE
Orders:    hydrALAZINE (Apresoline) 10 mg tablet; Take 1 tablet (10 mg) by mouth 3 times a day.

## 2025-01-30 NOTE — PROGRESS NOTES
Subjective   Reason for Visit: Ashli Justice is an 74 y.o. female here for a Medicare Wellness visit.          Reviewed all medications by prescribing practitioner or clinical pharmacist (such as prescriptions, OTCs, herbal therapies and supplements) and documented in the medical record.    Medicare Annual Wellness Visit Subsequent, Vitamin D Deficiency, Hypertension, Med Refill, Shortness of Breath (With weight gain), and Back Pain (With shoveling snow it is worse)  Annual Medicare physical and preventative visit  -Vaccinations reviewed and up-to-date  -Screening for colon cancer up-to-date  - Screening for depression negative  - Advanced directive reviewed  - Plan of care reviewed    Follow-up  -Uncontrolled hypertension needs a goal blood pressure 130/80 need to add hydralazine 10 mg 3 times daily continue monitoring  Need to obtain complete blood work  Follow-up 4 weeks to recheck blood pressure  - scalp seborrheic dermatitis patient counseled about ciclopirox shampoo 2-3 times a week follow-up results closely slowly improving  - Chronic bilateral lower extremity lymphedema compensated.  -Restless leg syndrome continues iron sulfate every night  -History of lumbosacral surgery and chronic neuropathy.  Patient to continue gabapentin 300 mg at bedtime  topiramate 25 mg 1  tablets twice a day patient, compensated.  - Patient seen by oncology underlying [facility] follow-up closely stable normal results repeat blood work as a scheduled in 6 months  -History of breast cancer remission follow-up hematology oncology, no recurrence follow-up oncology  -- Bilateral carpal tunnel syndrome treated conservatively declined surgical intervention at this time  - Underlying sleep apnea continue with fatigue and tiredness and weight gain declined CPAP declined evaluation by sleep clinic  Morbid obesity  --Hypertension, improving, counseled about weight loss  -Hypothyroid controlled  -Lymphedema continue with continuous wrapping per  "lymphedema clinic as needed doing much better  -Lumbosacral disease cut down gabapentin take it only at bedtime  -Carotid atherosclerosis ultrasound not changed less than 50% no TIA continue on aspirin no recurrent symptoms  Follow-up 4 weeks      Vitamin Deficiency  Hypertension  Associated symptoms include shortness of breath. Pertinent negatives include no chest pain, headaches or palpitations.   Med Refill  Pertinent negatives include no abdominal pain, arthralgias, chest pain, congestion, coughing, fatigue, fever, headaches, rash or sore throat.   Shortness of Breath  Pertinent negatives include no abdominal pain, chest pain, ear pain, fever, headaches, leg swelling, rash, sore throat or wheezing.   Back Pain  Pertinent negatives include no abdominal pain, chest pain, fever or headaches.       Patient Care Team:  Sagrario Rachel MD as PCP - General  Sagrario Rachel MD as PCP [facility]     Review of Systems   Constitutional:  Negative for fatigue, fever and unexpected weight change.   HENT:  Negative for congestion, ear discharge, ear pain, mouth sores, sinus pain and sore throat.    Eyes:  Negative for visual disturbance.   Respiratory:  Positive for shortness of breath. Negative for cough and wheezing.    Cardiovascular:  Negative for chest pain, palpitations and leg swelling.   Gastrointestinal:  Negative for abdominal pain, blood in stool and diarrhea.   Genitourinary:  Negative for difficulty urinating.   Musculoskeletal:  Positive for back pain. Negative for arthralgias.   Skin:  Negative for rash.   Neurological:  Negative for dizziness and headaches.   Hematological:  Does not bruise/bleed easily.   Psychiatric/Behavioral:  Negative for behavioral problems.    All other systems reviewed and are negative.      Objective   Vitals:  /82   Pulse 76   Temp 36.3 °C (97.4 °F)   Ht 1.575 m (5' 2\")   Wt 102 kg (225 lb 12.8 oz)   SpO2 95%   BMI 41.30 kg/m²     Lab Results "   Component Value Date    WBC 9.0 06/18/2024    HGB 12.4 06/18/2024    HCT 39.7 06/18/2024     06/18/2024    CHOL 126 07/12/2021    TRIG 226 (H) 07/12/2021    HDL 40.0 07/12/2021    ALT 23 06/18/2024    AST 22 06/18/2024     06/18/2024    K 4.0 06/18/2024     06/18/2024    CREATININE 0.96 06/18/2024    BUN 33 (H) 06/18/2024    CO2 25 06/18/2024    TSH 3.87 06/06/2023    INR 1.1 05/19/2021    HGBA1C 5.9 (A) 06/06/2023     par   Physical Exam  Vitals and nursing note reviewed.   Constitutional:       Appearance: Normal appearance.   HENT:      Head: Normocephalic.      Nose: Nose normal.   Eyes:      Conjunctiva/sclera: Conjunctivae normal.      Pupils: Pupils are equal, round, and reactive to light.   Cardiovascular:      Rate and Rhythm: Regular rhythm.   Pulmonary:      Effort: Pulmonary effort is normal.      Breath sounds: Normal breath sounds.   Abdominal:      General: Abdomen is flat.      Palpations: Abdomen is soft.   Musculoskeletal:      Cervical back: Neck supple.   Skin:     General: Skin is warm.   Neurological:      General: No focal deficit present.      Mental Status: She is oriented to person, place, and time.   Psychiatric:         Mood and Affect: Mood normal.         Assessment & Plan  Hypertension, unspecified type    Orders:    azilsartan-chlorthalidone (Edarbyclor) 40-25 mg tablet tablet; Take 1 tablet by mouth once daily.    hydrALAZINE (Apresoline) 10 mg tablet; Take 1 tablet (10 mg) by mouth 3 times a day.    Comprehensive Metabolic Panel; Future    Vitamin D deficiency    Orders:    cholecalciferol (Vitamin D-3) 125 mcg (5000 UT) capsule; Take 1 capsule (125 mcg) by mouth once daily.    Routine general medical examination at health care facility    Orders:    1 Year Follow Up In Primary Care - Wellness Exam; Future    Spinal stenosis of lumbosacral region    Orders:    tiZANidine (Zanaflex) 2 mg capsule; Take 1 capsule (2 mg) by mouth 3 times a day.    Non-pressure  chronic ulcer of other part of right foot limited to breakdown of skin         Malignant neoplasm of left female breast, unspecified estrogen receptor status, unspecified site of breast         Body mass index (BMI) 40.0-44.9, adult (Multi)    Orders:    Hemoglobin A1C; Future    Diabetes 1.5, managed as type 2 (Multi)         Stage 3a chronic kidney disease (Multi)    Orders:    hydrALAZINE (Apresoline) 10 mg tablet; Take 1 tablet (10 mg) by mouth 3 times a day.    High risk medication use    Orders:    CBC and Auto Differential; Future    Hypercholesteremia    Orders:    Lipid Panel; Future    Other fatigue    Orders:    TSH with reflex to Free T4 if abnormal; Future    Vitamin B12 deficiency    Orders:    Vitamin B12; Future    Vitamin D deficiency, unspecified    Orders:    Vitamin D 25-Hydroxy,Total (for eval of Vitamin D levels); Future    Abnormal finding of blood chemistry, unspecified    Orders:    Hemoglobin A1C; Future     Medicare Annual Wellness Visit Subsequent, Vitamin D Deficiency, Hypertension, Med Refill, Shortness of Breath (With weight gain), and Back Pain (With shoveling snow it is worse)  Annual Medicare physical and preventative visit  -Vaccinations reviewed and up-to-date  -Screening for colon cancer up-to-date  - Screening for depression negative  - Advanced directive reviewed  - Plan of care reviewed    Follow-up  -Uncontrolled hypertension needs a goal blood pressure 130/80 need to add hydralazine 10 mg 3 times daily continue monitoring  Need to obtain complete blood work  Follow-up 4 weeks to recheck blood pressure  - scalp seborrheic dermatitis patient counseled about ciclopirox shampoo 2-3 times a week follow-up results closely slowly improving  - Chronic bilateral lower extremity lymphedema compensated.  -Restless leg syndrome continues iron sulfate every night  -History of lumbosacral surgery and chronic neuropathy.  Patient to continue gabapentin 300 mg at bedtime  topiramate 25 mg 1   tablets twice a day patient, compensated.  - Patient seen by oncology underlying [facility] follow-up closely stable normal results repeat blood work as a scheduled in 6 months  -History of breast cancer remission follow-up hematology oncology, no recurrence follow-up oncology  -- Bilateral carpal tunnel syndrome treated conservatively declined surgical intervention at this time  - Underlying sleep apnea continue with fatigue and tiredness and weight gain declined CPAP declined evaluation by sleep clinic  Morbid obesity  --Hypertension, improving, counseled about weight loss  -Hypothyroid controlled  -Lymphedema continue with continuous wrapping per lymphedema clinic as needed doing much better  -Lumbosacral disease cut down gabapentin take it only at bedtime  -Carotid atherosclerosis ultrasound not changed less than 50% no TIA continue on aspirin no recurrent symptoms  Follow-up 4 weeks

## 2025-01-30 NOTE — ASSESSMENT & PLAN NOTE
Orders:    cholecalciferol (Vitamin D-3) 125 mcg (5000 UT) capsule; Take 1 capsule (125 mcg) by mouth once daily.

## 2025-01-30 NOTE — ASSESSMENT & PLAN NOTE
Orders:    azilsartan-chlorthalidone (Edarbyclor) 40-25 mg tablet tablet; Take 1 tablet by mouth once daily.    hydrALAZINE (Apresoline) 10 mg tablet; Take 1 tablet (10 mg) by mouth 3 times a day.    Comprehensive Metabolic Panel; Future

## 2025-02-11 DIAGNOSIS — I10 HYPERTENSION, UNSPECIFIED TYPE: ICD-10-CM

## 2025-02-11 RX ORDER — AZILSARTAN KAMEDOXOMIL AND CHLORTHALIDONE 40; 25 MG/1; MG/1
1 TABLET ORAL DAILY
Qty: 90 TABLET | Refills: 3 | Status: SHIPPED | OUTPATIENT
Start: 2025-02-11

## 2025-02-12 LAB
25(OH)D3+25(OH)D2 SERPL-MCNC: 65 NG/ML (ref 30–100)
ALBUMIN SERPL-MCNC: 4.2 G/DL (ref 3.6–5.1)
ALP SERPL-CCNC: 86 U/L (ref 37–153)
ALT SERPL-CCNC: 21 U/L (ref 6–29)
ANION GAP SERPL CALCULATED.4IONS-SCNC: 9 MMOL/L (CALC) (ref 7–17)
AST SERPL-CCNC: 19 U/L (ref 10–35)
BASOPHILS # BLD AUTO: 64 CELLS/UL (ref 0–200)
BASOPHILS NFR BLD AUTO: 0.7 %
BILIRUB SERPL-MCNC: 0.4 MG/DL (ref 0.2–1.2)
BUN SERPL-MCNC: 29 MG/DL (ref 7–25)
CALCIUM SERPL-MCNC: 9 MG/DL (ref 8.6–10.4)
CHLORIDE SERPL-SCNC: 108 MMOL/L (ref 98–110)
CHOLEST SERPL-MCNC: 132 MG/DL
CHOLEST/HDLC SERPL: 3.5 (CALC)
CO2 SERPL-SCNC: 24 MMOL/L (ref 20–32)
CREAT SERPL-MCNC: 1.01 MG/DL (ref 0.6–1)
EGFRCR SERPLBLD CKD-EPI 2021: 58 ML/MIN/1.73M2
EOSINOPHIL # BLD AUTO: 92 CELLS/UL (ref 15–500)
EOSINOPHIL NFR BLD AUTO: 1 %
ERYTHROCYTE [DISTWIDTH] IN BLOOD BY AUTOMATED COUNT: 14.4 % (ref 11–15)
EST. AVERAGE GLUCOSE BLD GHB EST-MCNC: 123 MG/DL
EST. AVERAGE GLUCOSE BLD GHB EST-SCNC: 6.8 MMOL/L
GLUCOSE SERPL-MCNC: 117 MG/DL (ref 65–99)
HBA1C MFR BLD: 5.9 % OF TOTAL HGB
HCT VFR BLD AUTO: 40 % (ref 35–45)
HDLC SERPL-MCNC: 38 MG/DL
HGB BLD-MCNC: 12.6 G/DL (ref 11.7–15.5)
LDLC SERPL CALC-MCNC: 64 MG/DL (CALC)
LYMPHOCYTES # BLD AUTO: 2116 CELLS/UL (ref 850–3900)
LYMPHOCYTES NFR BLD AUTO: 23 %
MCH RBC QN AUTO: 30.4 PG (ref 27–33)
MCHC RBC AUTO-ENTMCNC: 31.5 G/DL (ref 32–36)
MCV RBC AUTO: 96.4 FL (ref 80–100)
MONOCYTES # BLD AUTO: 745 CELLS/UL (ref 200–950)
MONOCYTES NFR BLD AUTO: 8.1 %
NEUTROPHILS # BLD AUTO: 6182 CELLS/UL (ref 1500–7800)
NEUTROPHILS NFR BLD AUTO: 67.2 %
NONHDLC SERPL-MCNC: 94 MG/DL (CALC)
PLATELET # BLD AUTO: 251 THOUSAND/UL (ref 140–400)
PMV BLD REES-ECKER: 10.4 FL (ref 7.5–12.5)
POTASSIUM SERPL-SCNC: 4.3 MMOL/L (ref 3.5–5.3)
PROT SERPL-MCNC: 6.9 G/DL (ref 6.1–8.1)
RBC # BLD AUTO: 4.15 MILLION/UL (ref 3.8–5.1)
SODIUM SERPL-SCNC: 141 MMOL/L (ref 135–146)
TRIGL SERPL-MCNC: 242 MG/DL
TSH SERPL-ACNC: 2.63 MIU/L (ref 0.4–4.5)
VIT B12 SERPL-MCNC: 397 PG/ML (ref 200–1100)
WBC # BLD AUTO: 9.2 THOUSAND/UL (ref 3.8–10.8)

## 2025-02-26 ENCOUNTER — APPOINTMENT (OUTPATIENT)
Dept: PRIMARY CARE | Facility: CLINIC | Age: 75
End: 2025-02-26
Payer: MEDICARE

## 2025-02-26 VITALS
SYSTOLIC BLOOD PRESSURE: 158 MMHG | OXYGEN SATURATION: 93 % | TEMPERATURE: 97.3 F | HEART RATE: 67 BPM | DIASTOLIC BLOOD PRESSURE: 66 MMHG | BODY MASS INDEX: 41.85 KG/M2 | WEIGHT: 228.8 LBS

## 2025-02-26 DIAGNOSIS — E78.00 HYPERCHOLESTEREMIA: ICD-10-CM

## 2025-02-26 DIAGNOSIS — L21.9 SEBORRHEIC DERMATITIS OF SCALP: ICD-10-CM

## 2025-02-26 DIAGNOSIS — M48.07 SPINAL STENOSIS OF LUMBOSACRAL REGION: ICD-10-CM

## 2025-02-26 DIAGNOSIS — I10 HYPERTENSION, UNSPECIFIED TYPE: ICD-10-CM

## 2025-02-26 DIAGNOSIS — N18.31 STAGE 3A CHRONIC KIDNEY DISEASE (MULTI): ICD-10-CM

## 2025-02-26 PROCEDURE — 3078F DIAST BP <80 MM HG: CPT | Performed by: INTERNAL MEDICINE

## 2025-02-26 PROCEDURE — 1160F RVW MEDS BY RX/DR IN RCRD: CPT | Performed by: INTERNAL MEDICINE

## 2025-02-26 PROCEDURE — 1159F MED LIST DOCD IN RCRD: CPT | Performed by: INTERNAL MEDICINE

## 2025-02-26 PROCEDURE — 1036F TOBACCO NON-USER: CPT | Performed by: INTERNAL MEDICINE

## 2025-02-26 PROCEDURE — 99214 OFFICE O/P EST MOD 30 MIN: CPT | Performed by: INTERNAL MEDICINE

## 2025-02-26 PROCEDURE — G2211 COMPLEX E/M VISIT ADD ON: HCPCS | Performed by: INTERNAL MEDICINE

## 2025-02-26 PROCEDURE — 3077F SYST BP >= 140 MM HG: CPT | Performed by: INTERNAL MEDICINE

## 2025-02-26 PROCEDURE — 1157F ADVNC CARE PLAN IN RCRD: CPT | Performed by: INTERNAL MEDICINE

## 2025-02-26 RX ORDER — TIRZEPATIDE 5 MG/.5ML
5 INJECTION, SOLUTION SUBCUTANEOUS WEEKLY
Qty: 4 ML | Refills: 3 | Status: SHIPPED | OUTPATIENT
Start: 2025-02-26 | End: 2025-02-26

## 2025-02-26 RX ORDER — TIRZEPATIDE 5 MG/.5ML
5 INJECTION, SOLUTION SUBCUTANEOUS WEEKLY
Qty: 4 ML | Refills: 3 | Status: SHIPPED | OUTPATIENT
Start: 2025-02-26

## 2025-02-26 RX ORDER — TIZANIDINE 2 MG/1
1 TABLET ORAL
COMMUNITY
Start: 2025-01-30

## 2025-02-26 RX ORDER — HYDRALAZINE HYDROCHLORIDE 25 MG/1
25 TABLET, FILM COATED ORAL 3 TIMES DAILY
Qty: 270 TABLET | Refills: 3 | Status: SHIPPED | OUTPATIENT
Start: 2025-02-26 | End: 2026-02-26

## 2025-02-26 ASSESSMENT — ENCOUNTER SYMPTOMS
BLOOD IN STOOL: 0
COUGH: 0
HIP PAIN: 1
ARTHRALGIAS: 0
HEADACHES: 0
DIARRHEA: 0
UNEXPECTED WEIGHT CHANGE: 1
HYPERTENSION: 1
FATIGUE: 0
PALPITATIONS: 0
DIZZINESS: 0
SINUS PAIN: 0
SORE THROAT: 0
FEVER: 0
BRUISES/BLEEDS EASILY: 0
DIFFICULTY URINATING: 0
WHEEZING: 0
ABDOMINAL PAIN: 0
SHORTNESS OF BREATH: 1

## 2025-02-26 NOTE — PROGRESS NOTES
Subjective   Patient ID: Ashli Justice is a 74 y.o. female who presents for Results, Hypertension (Blood pressure check), Shortness of Breath (On exertion, walking, feels bloated), Hair/Scalp Problem (Dry scalp even with shampoo), and Hip Pain (Bilateral continues).    - Morbid obesity unable to lose weight progressive weakness and lower back pain patient had surgical intervention before and pain management without improvement takes Tylenol only for pain  Patient counseled about sleep hygiene weight loss and low-carb diet underlying history of obstructive sleep apnea  Patient will benefit from GLP-1 weight reduction medication prescription sent to [facility] for preauthorization patient to start on Zepbound will titrate up slowly reevaluate patient in 4 weeks  Continue with low-carb diet  - Hypertension not controlled needs a goal of blood pressure 120/80  Increase hydralazine to take 25 mg 3 times daily  Follow-up 3 months  - Seborrheic scalp dermatitis slowly improving patient reassured to continue with current medication ciclopirox twice a week  - Chronic bilateral lower extremity lymphedema compensated.  -Restless leg syndrome continues iron sulfate every night  -History of lumbosacral surgery and chronic neuropathy.  Patient to continue gabapentin 300 mg at bedtime  topiramate 25 mg 1  tablets twice a day patient, compensated.  - Patient seen by oncology underlying MUGUS follow-up closely stable normal results repeat blood work as a scheduled in 6 months  -History of breast cancer remission follow-up hematology oncology, no recurrence follow-up oncology  -- Bilateral carpal tunnel syndrome treated conservatively declined surgical intervention at this time  - Underlying sleep apnea continue with fatigue and tiredness and weight gain declined CPAP declined evaluation by sleep clinic  Morbid obesity continues weight loss as recommended  --Hypertension, improving, counseled about weight loss  -Hypothyroid  controlled  -Lymphedema continue with continuous wrapping per lymphedema clinic as needed doing much better  -Lumbosacral disease cut down gabapentin take it only at bedtime  -Carotid atherosclerosis ultrasound not changed less than 50% no TIA continue on aspirin no recurrent symptoms  Follow-up 4 weeks      Hypertension  Associated symptoms include shortness of breath. Pertinent negatives include no chest pain, headaches or palpitations.   Shortness of Breath  Pertinent negatives include no abdominal pain, chest pain, ear pain, fever, headaches, leg swelling, rash, sore throat or wheezing.   Hip Pain            Review of Systems   Constitutional:  Positive for unexpected weight change. Negative for fatigue and fever.   HENT:  Negative for congestion, ear discharge, ear pain, mouth sores, sinus pain and sore throat.    Eyes:  Negative for visual disturbance.   Respiratory:  Positive for shortness of breath. Negative for cough and wheezing.    Cardiovascular:  Negative for chest pain, palpitations and leg swelling.   Gastrointestinal:  Negative for abdominal pain, blood in stool and diarrhea.   Genitourinary:  Negative for difficulty urinating.   Musculoskeletal:  Negative for arthralgias.   Skin:  Negative for rash.   Neurological:  Negative for dizziness and headaches.   Hematological:  Does not bruise/bleed easily.   Psychiatric/Behavioral:  Negative for behavioral problems.    All other systems reviewed and are negative.      Objective   Lab Results   Component Value Date    HGBA1C 5.9 (H) 02/11/2025      Lab Results   Component Value Date    WBC 9.2 02/11/2025    HGB 12.6 02/11/2025    HCT 40.0 02/11/2025     02/11/2025    CHOL 132 02/11/2025    TRIG 242 (H) 02/11/2025    HDL 38 (L) 02/11/2025    ALT 21 02/11/2025    AST 19 02/11/2025     02/11/2025    K 4.3 02/11/2025     02/11/2025    CREATININE 1.01 (H) 02/11/2025    BUN 29 (H) 02/11/2025    CO2 24 02/11/2025    TSH 2.63 02/11/2025    INR  1.1 05/19/2021    HGBA1C 5.9 (H) 02/11/2025     par   /66   Pulse 67   Temp 36.3 °C (97.3 °F)   Wt 104 kg (228 lb 12.8 oz)   SpO2 93%   BMI 41.85 kg/m²     Physical Exam  Vitals and nursing note reviewed.   Constitutional:       Appearance: Normal appearance. She is obese.   HENT:      Head: Normocephalic.      Nose: Nose normal.   Eyes:      Conjunctiva/sclera: Conjunctivae normal.      Pupils: Pupils are equal, round, and reactive to light.   Cardiovascular:      Rate and Rhythm: Regular rhythm.   Pulmonary:      Effort: Pulmonary effort is normal.      Breath sounds: Normal breath sounds.   Abdominal:      General: Abdomen is flat.      Palpations: Abdomen is soft.   Musculoskeletal:      Cervical back: Neck supple.      Right lower leg: Edema present.      Left lower leg: Edema present.   Skin:     General: Skin is warm.   Neurological:      General: No focal deficit present.      Mental Status: She is oriented to person, place, and time.   Psychiatric:         Mood and Affect: Mood normal.         Assessment/Plan   Ashli was seen today for results, hypertension, shortness of breath, hair/scalp problem and hip pain.  Diagnoses and all orders for this visit:  Body mass index (BMI) 40.0-44.9, adult (Multi) (Primary)  -     Discontinue: tirzepatide (Mounjaro) 5 mg/0.5 mL pen injector; Inject 5 mg under the skin 1 (one) time per week.  -     tirzepatide, weight loss, (Zepbound) 5 mg/0.5 mL injection; Inject 5 mg under the skin 1 (one) time per week.  Hypertension, unspecified type  -     hydrALAZINE (Apresoline) 25 mg tablet; Take 1 tablet (25 mg) by mouth 3 times a day.  -     Discontinue: tirzepatide (Mounjaro) 5 mg/0.5 mL pen injector; Inject 5 mg under the skin 1 (one) time per week.  -     tirzepatide, weight loss, (Zepbound) 5 mg/0.5 mL injection; Inject 5 mg under the skin 1 (one) time per week.  Stage 3a chronic kidney disease (Multi)  -     hydrALAZINE (Apresoline) 25 mg tablet; Take 1 tablet (25  mg) by mouth 3 times a day.  Spinal stenosis of lumbosacral region  -     Discontinue: tirzepatide (Mounjaro) 5 mg/0.5 mL pen injector; Inject 5 mg under the skin 1 (one) time per week.  -     tirzepatide, weight loss, (Zepbound) 5 mg/0.5 mL injection; Inject 5 mg under the skin 1 (one) time per week.  Hypercholesteremia  Seborrheic dermatitis of scalp  Other orders  -     Follow Up In Primary Care - Established  -     Follow Up In Primary Care - Established; Future   - Morbid obesity unable to lose weight progressive weakness and lower back pain patient had surgical intervention before and pain management without improvement takes Tylenol only for pain  Patient counseled about sleep hygiene weight loss and low-carb diet underlying history of obstructive sleep apnea  Patient will benefit from GLP-1 weight reduction medication prescription sent to [facility] for preauthorization patient to start on Zepbound will titrate up slowly reevaluate patient in 4 weeks  Continue with low-carb diet  - Hypertension not controlled needs a goal of blood pressure 120/80  Increase hydralazine to take 25 mg 3 times daily  Follow-up 3 months  - Seborrheic scalp dermatitis slowly improving patient reassured to continue with current medication ciclopirox twice a week  - Chronic bilateral lower extremity lymphedema compensated.  -Restless leg syndrome continues iron sulfate every night  -History of lumbosacral surgery and chronic neuropathy.  Patient to continue gabapentin 300 mg at bedtime  topiramate 25 mg 1  tablets twice a day patient, compensated.  - Patient seen by oncology underlying MUGUS follow-up closely stable normal results repeat blood work as a scheduled in 6 months  -History of breast cancer remission follow-up hematology oncology, no recurrence follow-up oncology  -- Bilateral carpal tunnel syndrome treated conservatively declined surgical intervention at this time  - Underlying sleep apnea continue with fatigue and  tiredness and weight gain declined CPAP declined evaluation by sleep clinic  Morbid obesity continues weight loss as recommended  --Hypertension, improving, counseled about weight loss  -Hypothyroid controlled  -Lymphedema continue with continuous wrapping per lymphedema clinic as needed doing much better  -Lumbosacral disease cut down gabapentin take it only at bedtime  -Carotid atherosclerosis ultrasound not changed less than 50% no TIA continue on aspirin no recurrent symptoms  Follow-up 4 weeks

## 2025-03-03 ENCOUNTER — HOSPITAL ENCOUNTER (OUTPATIENT)
Dept: RADIOLOGY | Facility: HOSPITAL | Age: 75
Discharge: HOME | End: 2025-03-03
Payer: MEDICARE

## 2025-03-03 DIAGNOSIS — C50.912 MALIGNANT NEOPLASM OF LEFT BREAST IN FEMALE, ESTROGEN RECEPTOR POSITIVE, UNSPECIFIED SITE OF BREAST: ICD-10-CM

## 2025-03-03 DIAGNOSIS — D47.2 MGUS (MONOCLONAL GAMMOPATHY OF UNKNOWN SIGNIFICANCE): ICD-10-CM

## 2025-03-03 DIAGNOSIS — Z17.0 MALIGNANT NEOPLASM OF LEFT BREAST IN FEMALE, ESTROGEN RECEPTOR POSITIVE, UNSPECIFIED SITE OF BREAST: ICD-10-CM

## 2025-03-03 PROCEDURE — 77062 BREAST TOMOSYNTHESIS BI: CPT

## 2025-03-03 PROCEDURE — G0279 TOMOSYNTHESIS, MAMMO: HCPCS | Performed by: RADIOLOGY

## 2025-03-03 PROCEDURE — 77066 DX MAMMO INCL CAD BI: CPT | Performed by: RADIOLOGY

## 2025-03-13 DIAGNOSIS — E66.812 CLASS 2 OBESITY WITH BODY MASS INDEX (BMI) OF 39.0 TO 39.9 IN ADULT, UNSPECIFIED OBESITY TYPE, UNSPECIFIED WHETHER SERIOUS COMORBIDITY PRESENT: Primary | ICD-10-CM

## 2025-03-13 DIAGNOSIS — E13.9 DIABETES 1.5, MANAGED AS TYPE 2 (MULTI): ICD-10-CM

## 2025-03-18 DIAGNOSIS — E78.00 HYPERCHOLESTEROLEMIA: ICD-10-CM

## 2025-03-18 RX ORDER — ROSUVASTATIN CALCIUM 10 MG/1
10 TABLET, COATED ORAL DAILY
Qty: 90 TABLET | Refills: 0 | Status: SHIPPED | OUTPATIENT
Start: 2025-03-18

## 2025-03-27 ENCOUNTER — OFFICE VISIT (OUTPATIENT)
Dept: HEMATOLOGY/ONCOLOGY | Facility: HOSPITAL | Age: 75
End: 2025-03-27
Payer: MEDICARE

## 2025-03-27 ENCOUNTER — APPOINTMENT (OUTPATIENT)
Dept: LAB | Facility: HOSPITAL | Age: 75
End: 2025-03-27
Payer: MEDICARE

## 2025-03-27 VITALS
OXYGEN SATURATION: 94 % | RESPIRATION RATE: 16 BRPM | WEIGHT: 226.08 LBS | BODY MASS INDEX: 41.6 KG/M2 | HEIGHT: 62 IN | SYSTOLIC BLOOD PRESSURE: 137 MMHG | TEMPERATURE: 96.8 F | DIASTOLIC BLOOD PRESSURE: 77 MMHG | HEART RATE: 61 BPM

## 2025-03-27 DIAGNOSIS — E61.1 IRON DEFICIENCY: ICD-10-CM

## 2025-03-27 DIAGNOSIS — D47.2 MGUS (MONOCLONAL GAMMOPATHY OF UNKNOWN SIGNIFICANCE): Primary | ICD-10-CM

## 2025-03-27 DIAGNOSIS — C50.912 MALIGNANT NEOPLASM OF LEFT BREAST IN FEMALE, ESTROGEN RECEPTOR POSITIVE, UNSPECIFIED SITE OF BREAST: ICD-10-CM

## 2025-03-27 DIAGNOSIS — Z17.0 MALIGNANT NEOPLASM OF LEFT BREAST IN FEMALE, ESTROGEN RECEPTOR POSITIVE, UNSPECIFIED SITE OF BREAST: ICD-10-CM

## 2025-03-27 PROCEDURE — 1157F ADVNC CARE PLAN IN RCRD: CPT | Performed by: INTERNAL MEDICINE

## 2025-03-27 PROCEDURE — 1125F AMNT PAIN NOTED PAIN PRSNT: CPT | Performed by: INTERNAL MEDICINE

## 2025-03-27 PROCEDURE — 3075F SYST BP GE 130 - 139MM HG: CPT | Performed by: INTERNAL MEDICINE

## 2025-03-27 PROCEDURE — 83521 IG LIGHT CHAINS FREE EACH: CPT

## 2025-03-27 PROCEDURE — 99214 OFFICE O/P EST MOD 30 MIN: CPT | Performed by: INTERNAL MEDICINE

## 2025-03-27 PROCEDURE — 84155 ASSAY OF PROTEIN SERUM: CPT

## 2025-03-27 PROCEDURE — 1159F MED LIST DOCD IN RCRD: CPT | Performed by: INTERNAL MEDICINE

## 2025-03-27 PROCEDURE — 3078F DIAST BP <80 MM HG: CPT | Performed by: INTERNAL MEDICINE

## 2025-03-27 PROCEDURE — 36415 COLL VENOUS BLD VENIPUNCTURE: CPT

## 2025-03-27 PROCEDURE — G2211 COMPLEX E/M VISIT ADD ON: HCPCS | Performed by: INTERNAL MEDICINE

## 2025-03-27 ASSESSMENT — ENCOUNTER SYMPTOMS
RESPIRATORY NEGATIVE: 1
CARDIOVASCULAR NEGATIVE: 1
CONSTITUTIONAL NEGATIVE: 1
GASTROINTESTINAL NEGATIVE: 1

## 2025-03-27 ASSESSMENT — PAIN SCALES - GENERAL: PAINLEVEL_OUTOF10: 3

## 2025-03-27 ASSESSMENT — COLUMBIA-SUICIDE SEVERITY RATING SCALE - C-SSRS
2. HAVE YOU ACTUALLY HAD ANY THOUGHTS OF KILLING YOURSELF?: NO
1. IN THE PAST MONTH, HAVE YOU WISHED YOU WERE DEAD OR WISHED YOU COULD GO TO SLEEP AND NOT WAKE UP?: NO
6. HAVE YOU EVER DONE ANYTHING, STARTED TO DO ANYTHING, OR PREPARED TO DO ANYTHING TO END YOUR LIFE?: NO

## 2025-03-27 NOTE — PROGRESS NOTES
"Patient ID: Ashli Justice is a 74 y.o. female.    Subjective:  Returns for follow up for MGUS and remote h/o breast cancer.   Denies having new complaints. No B symptoms.     Assessment/Plan:  ? MGUS: Found in summer of 2023. 0.1 g/dL IgG kappa. So far this has remained stable. Will repeat it today and in a year. If it keeps stable, will check every other year.     H/o breast cancer: ER+ IDCA with DCIS. S/p surgery => adjuvant radiation in 2012 followed by letrozole x 5 years. Last MMG in Mar 2025 was OK. Continue screening.     Review Of Systems:  Review of Systems   Constitutional: Negative.    HENT:  Negative.     Respiratory: Negative.     Cardiovascular: Negative.    Gastrointestinal: Negative.        Physical Exam:  /77 (BP Location: Right arm, Patient Position: Sitting, BP Cuff Size: Large adult)   Pulse 61   Temp 36 °C (96.8 °F) (Temporal)   Resp 16   Ht 1.575 m (5' 2\")   Wt 103 kg (226 lb 1.3 oz)   SpO2 94%   BMI 41.35 kg/m²   BSA: 2.12 meters squared  Performance Status: Asymptomatic  Physical Exam  HENT:      Head: Normocephalic and atraumatic.   Eyes:      General: No scleral icterus.  Pulmonary:      Effort: Pulmonary effort is normal.   Musculoskeletal:         General: Normal range of motion.   Skin:     Coloration: Skin is not jaundiced.   Neurological:      General: No focal deficit present.      Mental Status: She is alert and oriented to person, place, and time.         Results:  Diagnostic Results   Lab Results   Component Value Date    WBC 9.2 02/11/2025    HGB 12.6 02/11/2025    HCT 40.0 02/11/2025    MCV 96.4 02/11/2025     02/11/2025     Lab Results   Component Value Date    CALCIUM 9.0 02/11/2025     02/11/2025    K 4.3 02/11/2025    CO2 24 02/11/2025     02/11/2025    BUN 29 (H) 02/11/2025    CREATININE 1.01 (H) 02/11/2025    ALT 21 02/11/2025    AST 19 02/11/2025       Current Outpatient Medications:     acetaminophen (Tylenol) 325 mg tablet, Take 1 tablet " (325 mg) by mouth every 6 hours if needed., Disp: , Rfl:     azilsartan-chlorthalidone (Edarbyclor) 40-25 mg tablet tablet, Take 1 tablet by mouth once daily., Disp: 90 tablet, Rfl: 3    brimonidine (AlphaGAN P) 0.2 % ophthalmic solution, , Disp: , Rfl:     carvedilol (Coreg) 25 mg tablet, TAKE 1 TABLET DAILY, Disp: 90 tablet, Rfl: 1    cholecalciferol (Vitamin D-3) 125 mcg (5000 UT) capsule, Take 1 capsule (125 mcg) by mouth once daily., Disp: 90 capsule, Rfl: 1    ciclopirox 1 % shampoo, Apply shampoo to the scalp 3 times a week as a, Disp: 120 mL, Rfl: 0    cyanocobalamin (Vitamin B-12) 1,000 mcg tablet, Take 1 tablet (1,000 mcg) by mouth once daily., Disp: , Rfl:     dorzolamide-timoloL (Cosopt) 22.3-6.8 mg/mL ophthalmic solution, Administer into affected eye(s) twice a day., Disp: , Rfl:     ferrous sulfate 325 (65 Fe) MG EC tablet, Take 1 tablet by mouth once daily with breakfast. Do not crush, chew, or split., Disp: 90 tablet, Rfl: 3    gabapentin (Neurontin) 300 mg capsule, Take 1 capsule (300 mg) by mouth once daily., Disp: 90 capsule, Rfl: 0    hydrALAZINE (Apresoline) 25 mg tablet, Take 1 tablet (25 mg) by mouth 3 times a day., Disp: 270 tablet, Rfl: 3    latanoprost (Xalatan) 0.005 % ophthalmic solution, Administer into affected eye(s)., Disp: , Rfl:     levothyroxine (Synthroid, Levoxyl) 50 mcg tablet, TAKE 1 TABLET DAILY, Disp: 90 tablet, Rfl: 1    omega 3-dha-epa-fish oil (Fish OiL) 1,000 mg (120 mg-180 mg) capsule, Take 1 capsule (1,000 mg) by mouth once daily., Disp: , Rfl:     pramipexole (Mirapex) 1.5 mg tablet, TAKE ONE-HALF (1/2) TABLET DAILY AT BEDTIME, Disp: 45 tablet, Rfl: 3    rosuvastatin (Crestor) 10 mg tablet, TAKE 1 TABLET DAILY, Disp: 90 tablet, Rfl: 0    tiZANidine (Zanaflex) 2 mg capsule, Take 1 capsule (2 mg) by mouth 3 times a day., Disp: 90 capsule, Rfl: 11    topiramate (Topamax) 25 mg tablet, Take 1 tablet (25 mg) by mouth 2 times a day., Disp: 180 tablet, Rfl: 0    tirzepatide,  weight loss, (Zepbound) 5 mg/0.5 mL injection, Inject 5 mg under the skin 1 (one) time per week. (Patient not taking: Reported on 3/27/2025), Disp: 4 mL, Rfl: 3    tiZANidine (Zanaflex) 2 mg tablet, Take 1 tablet (2 mg) by mouth 3 times a day., Disp: , Rfl:      Past Surgical History:   Procedure Laterality Date    APPENDECTOMY  07/23/2012    Appendectomy    BLADDER SURGERY  09/24/2012    Bladder Surgery    BREAST BIOPSY  07/23/2012    Biopsy Breast Percutaneous Needle Core    BREAST LUMPECTOMY Left 06/20/2013    Breast Surgery Lumpectomy with radiation     Family History   Problem Relation Name Age of Onset    Heart disease Mother      Hypertension Mother      Heart disease Father      Breast cancer Paternal Grandmother      Cancer Other Family History     Heart disease Other Family History     Hypertension Other Family History     Cancer Other grandparent       reports that she has quit smoking. Her smoking use included cigarettes. She has a 30 pack-year smoking history. She has never used smokeless tobacco.    Diagnoses and all orders for this visit:  MGUS (monoclonal gammopathy of unknown significance)  -     Clinic Appointment Request Follow up (in person)  -     Clinic Appointment Request; Future  -     CBC and Auto Differential; Future  -     Serum Protein Electrophoresis + Immunofixation; Future  -     [place]/Lambda Free Light Chain, Serum; Future  -     Comprehensive Metabolic Panel; Future  -     [place]/Lambda Free Light Chain, Serum; Future  -     Serum Protein Electrophoresis + Immunofixation; Future  Malignant neoplasm of left breast in female, estrogen receptor positive, unspecified site of breast  -     Clinic Appointment Request Follow up (in person)  -     Clinic Appointment Request; Future  -     CBC and Auto Differential; Future  -     Serum Protein Electrophoresis + Immunofixation; Future  -     Peterson/Lambda Free Light Chain, Serum; Future  -     Comprehensive Metabolic Panel; Future  -      [place]/Lambda Free Light Chain, Serum; Future  -     Serum Protein Electrophoresis + Immunofixation; Future  Iron deficiency  -     Clinic Appointment Request Follow up (in person)       Jass Gamez MD

## 2025-03-28 LAB
KAPPA LC SERPL-MCNC: 4.15 MG/DL (ref 0.33–1.94)
KAPPA LC/LAMBDA SER: 1.54 {RATIO} (ref 0.26–1.65)
LAMBDA LC SERPL-MCNC: 2.69 MG/DL (ref 0.57–2.63)
PROT SERPL-MCNC: 7 G/DL (ref 6.4–8.2)

## 2025-04-01 LAB
ALBUMIN: 4 G/DL (ref 3.4–5)
ALPHA 1 GLOBULIN: 0.4 G/DL (ref 0.2–0.6)
ALPHA 2 GLOBULIN: 0.9 G/DL (ref 0.4–1.1)
BETA GLOBULIN: 0.8 G/DL (ref 0.5–1.2)
GAMMA GLOBULIN: 0.9 G/DL (ref 0.5–1.4)
IMMUNOFIXATION COMMENT: ABNORMAL
M-PROTEIN 1: 0.1 G/DL
PATH REVIEW - SERUM IMMUNOFIXATION: ABNORMAL
PATH REVIEW-SERUM PROTEIN ELECTROPHORESIS: ABNORMAL
PROTEIN ELECTROPHORESIS COMMENT: ABNORMAL

## 2025-04-11 DIAGNOSIS — G62.9 NEUROPATHY: ICD-10-CM

## 2025-04-11 RX ORDER — TOPIRAMATE 25 MG/1
25 TABLET ORAL 2 TIMES DAILY
Qty: 180 TABLET | Refills: 0 | Status: SHIPPED | OUTPATIENT
Start: 2025-04-11

## 2025-04-21 ENCOUNTER — TELEPHONE (OUTPATIENT)
Dept: PRIMARY CARE | Facility: CLINIC | Age: 75
End: 2025-04-21
Payer: MEDICARE

## 2025-04-25 NOTE — PROGRESS NOTES
"  Clinical Pharmacy Appointment    Patient ID: Ashli Justice is a 74 y.o. female who presents for Diabetes and weight loss.    Pt is here for First appointment.     Referring Provider: Sagrario Rachel MD  PCP: Sagrario Rachel MD  Last visit with PCP: 2/26/25   Next visit with PCP: 5/27/25    Subjective   Medication Reconciliation:  Not completed    Medication System Management  Patient's preferred pharmacy: [facility]  Adherence/Organization: No issues   Affordability/Accessibility: Zepbound $700 per month     Interval History  Prescribed Zepbound but has not yet started    HPI  DIABETES MELLITUS TYPE 1.5:    Known diabetic complications: None.  Does patient follow with Endocrinology: No  Last optometry exam: Unknown   Most recent visit in Podiatry: Unknown -- patient denies sores or cuts on feet today      Current diabetic medications include:  None     Clarifications to above regimen: None   Adverse Effects: N/A    Past diabetic medications include:  None     Glucose Readings:  Glucometer/CGM Type: None   Patient tests BG 0 times per day    Current home BG readings (mg/dL): N/A   Previous home BG readings (mg/dL): N/A    Any episodes of hypoglycemia? No, none .  Did patient treat episode of hypoglycemia appropriately? N/A  Does the patient have a prescription for ready-to-use Glucagon? Not on insulin    Lifestyle:  Diet: Not discussed  Exercise: Not discussed  Tobacco history: 30 py history. No longer smoking.     Secondary Prevention:  Statin? Yes - Rosuvastatin 10 mg daily   ACE-I/ARB? Yes - Azilsartan-chlorthalidone 40-25 mg daily   Aspirin? No    Pertinent PMH Review:  PMH of Pancreatitis: No  PMH of Retinopathy: No  PMH of Urinary Tract Infections: No  PMH of MTC: No  PMH of MEN2: No  UACR/EGFR in last year?: No  No results found for: \"MICROALBCREA\"    Immunizations:  Influenza? Yes  COVID? No  Pneumonia? Yes  Shingles? Yes  Hepatitis B? No    WEIGHT LOSS  BMI Readings from Last 3 Encounters: "   03/27/25 41.35 kg/m²   02/26/25 41.85 kg/m²   01/30/25 41.30 kg/m²      Starting weight: 226 lbs. (3/27/25)  Current weight: 226 lbs.    Pertinent PMH Review:  PMH of Pancreatitis: No  PMH of Retinopathy: No  PMH of MTC: No  PMH of MEN2: No    Non-Pharmacological Therapy  Weight loss techniques attempted:  Self-directed dieting:      Pharmacological Therapy  Current Medications: None   Adverse Effects: N/A  Previous Medications: None     Insurance coverage of weight-loss medications? Yes, high copay  Eligible for copay cards/programs? No, medicare insurance   Eligible for [facility] PAP? N/A    Medication Estimated Cost Expected weight loss Patient Risks and Cautions Notes   Wegovy   (semaglutide) $499/mo with savings card. 10-20%       Zepbound   (tirzepatide) $650/mo with savings card.  Vials available at lower costs. 10-20%     Qsymia (phentermine-topiramate) $98/month via  Qsymia Engage 8-10%  Controlled substance   Contrave (bupropion-naltrexone) $99/month   via [employer] 5-10%     Adipex   (phentermine) ~$15/month 3-5%  Controlled substance,  Short-term use only   Elijah   (OTC Orlistat) ~$60/month 3-5%  Adverse effects likely outweigh benefit.      Objective   Allergies[1]  Social History     Social History Narrative    Not on file      Medication Review  Current Outpatient Medications   Medication Instructions    acetaminophen (TYLENOL) 325 mg, Every 6 hours PRN    azilsartan-chlorthalidone (Edarbyclor) 40-25 mg tablet tablet 1 tablet, oral, Daily    brimonidine (AlphaGAN P) 0.2 % ophthalmic solution     carvedilol (COREG) 25 mg, oral, Daily    cholecalciferol (VITAMIN D-3) 125 mcg, oral, Daily    ciclopirox 1 % shampoo Apply shampoo to the scalp 3 times a week as a    cyanocobalamin (Vitamin B-12) 1,000 mcg tablet 1 tablet, Daily    dorzolamide-timoloL (Cosopt) 22.3-6.8 mg/mL ophthalmic solution 2 times daily    ferrous sulfate 325 (65 Fe) MG EC tablet 1 tablet, oral, Daily with breakfast, Do not crush, chew, or  "split.    gabapentin (NEURONTIN) 300 mg, oral, Daily    hydrALAZINE (APRESOLINE) 25 mg, oral, 3 times daily    latanoprost (Xalatan) 0.005 % ophthalmic solution Administer into affected eye(s).    levothyroxine (SYNTHROID, LEVOXYL) 50 mcg, oral, Daily    omega 3-dha-epa-fish oil (Fish OiL) 1,000 mg (120 mg-180 mg) capsule 1,000 mg, Daily    pramipexole (Mirapex) 1.5 mg tablet TAKE ONE-HALF (1/2) TABLET DAILY AT BEDTIME    rosuvastatin (CRESTOR) 10 mg, oral, Daily    tiZANidine (Zanaflex) 2 mg tablet 1 tablet, 3 times daily (0900,1400,1900)    tiZANidine (ZANAFLEX) 2 mg, oral, 3 times daily    topiramate (TOPAMAX) 25 mg, oral, 2 times daily    Zepbound 5 mg, subcutaneous, Weekly      Vitals  BP Readings from Last 2 Encounters:   03/27/25 137/77   02/26/25 158/66     BMI Readings from Last 1 Encounters:   03/27/25 41.35 kg/m²      Labs  A1C  Lab Results   Component Value Date    HGBA1C 5.9 (H) 02/11/2025    HGBA1C 5.9 (A) 06/06/2023     BMP  Lab Results   Component Value Date    CALCIUM 9.0 02/11/2025     02/11/2025    K 4.3 02/11/2025    CO2 24 02/11/2025     02/11/2025    BUN 29 (H) 02/11/2025    CREATININE 1.01 (H) 02/11/2025    EGFR 58 (L) 02/11/2025     LFTs  Lab Results   Component Value Date    ALT 21 02/11/2025    AST 19 02/11/2025    ALKPHOS 86 02/11/2025    BILITOT 0.4 02/11/2025     FLP  Lab Results   Component Value Date    TRIG 242 (H) 02/11/2025    CHOL 132 02/11/2025    LDLF 41 07/12/2021    LDLCALC 64 02/11/2025    HDL 38 (L) 02/11/2025     Urine Microalbumin  No results found for: \"MICROALBCREA\"  Weight Management  Wt Readings from Last 3 Encounters:   03/27/25 103 kg (226 lb 1.3 oz)   02/26/25 104 kg (228 lb 12.8 oz)   01/30/25 102 kg (225 lb 12.8 oz)      There is no height or weight on file to calculate BMI.     Assessment/Plan   Problem List Items Addressed This Visit       Class 2 obesity with body mass index (BMI) of 39.0 to 39.9 in adult    Diabetes 1.5, managed as type 2 (Multi) "     Patient's goal A1c is < 7.0%. Is pt at goal? most recent A1c on 2/11/25 of 5.9%. Prior A1c was 5.9% on 6/6/23.     Rationale for plan: Patient does not check BG at home. Patient's most recent A1c was well controlled at 5.9%. Patient denies any low BG events. Patient is not on any therapy for diabetes at this time. Given that BG is stable at this time, encouraged patient to continue diet and lifestyle modifications. Plan to follow up with primary care as scheduled.     Medication Changes: None     Patient is not on any therapy for weight loss at this time. Patient's current weight reported as 226 pounds. Patient has not started Zepbound due to concerns for side effects and cost. Patient endorses $700 monthly copay for Zepbound. Advised that copay assistance is available and patient would likely have minimal to no copay. Patient verbalized understanding but stated that side effects are her primary concern. Patient stated that she is not willing to risk the possibility for vomiting or diarrhea for weight loss. Writer attempted to provide education on starting at low dose and slow titration to limit the risk of side effects. Patient understood but stated that she will not change her mind. Patient is not interested in any other therapy for weight loss at this time. Due to patient concerns, plan to continue diet and lifestyle changes to support weight loss. Advised patient to continue to follow with PCP for further management.     Medication Changes: None    Monitoring and Education:  All questions and concerns addressed. Contact pharmacist with any further questions or concerns prior to next appointment.  Reviewed dietary recommendations: Healthy Plate method    Clinical Pharmacist follow-up: Patient declined follow up   Patient is not followed in CCM. (If yes, track minutes under compass jase at each visit)    Continue all meds under the continuation of care with the referring provider and clinical pharmacy  team.    Thank you,  Tasneem Jean-Baptiste, PharmD  Clinical Pharmacist  [phone]    Verbal consent to manage patient's drug therapy was obtained from the patient. They were informed they may decline to participate or withdraw from participation in pharmacy services at any time.         [1]   Allergies  Allergen Reactions    Milk Diarrhea

## 2025-04-28 DIAGNOSIS — M51.369 LUMBAR DEGENERATIVE DISC DISEASE: ICD-10-CM

## 2025-04-28 RX ORDER — GABAPENTIN 300 MG/1
300 CAPSULE ORAL DAILY
Qty: 90 CAPSULE | Refills: 0 | Status: SHIPPED | OUTPATIENT
Start: 2025-04-28 | End: 2025-07-27

## 2025-04-29 ENCOUNTER — APPOINTMENT (OUTPATIENT)
Dept: PHARMACY | Facility: HOSPITAL | Age: 75
End: 2025-04-29
Payer: MEDICARE

## 2025-04-29 DIAGNOSIS — E13.9 DIABETES 1.5, MANAGED AS TYPE 2 (MULTI): ICD-10-CM

## 2025-04-29 DIAGNOSIS — E66.812 CLASS 2 OBESITY WITH BODY MASS INDEX (BMI) OF 39.0 TO 39.9 IN ADULT, UNSPECIFIED OBESITY TYPE, UNSPECIFIED WHETHER SERIOUS COMORBIDITY PRESENT: ICD-10-CM

## 2025-05-27 ENCOUNTER — APPOINTMENT (OUTPATIENT)
Dept: PRIMARY CARE | Facility: CLINIC | Age: 75
End: 2025-05-27
Payer: MEDICARE

## 2025-05-27 VITALS
DIASTOLIC BLOOD PRESSURE: 62 MMHG | WEIGHT: 228.6 LBS | SYSTOLIC BLOOD PRESSURE: 138 MMHG | OXYGEN SATURATION: 96 % | HEART RATE: 72 BPM | BODY MASS INDEX: 41.81 KG/M2 | TEMPERATURE: 97.1 F

## 2025-05-27 DIAGNOSIS — I10 HYPERTENSION, UNSPECIFIED TYPE: Primary | ICD-10-CM

## 2025-05-27 DIAGNOSIS — G25.81 RESTLESS LEG SYNDROME: ICD-10-CM

## 2025-05-27 DIAGNOSIS — E55.9 VITAMIN D DEFICIENCY: ICD-10-CM

## 2025-05-27 DIAGNOSIS — E78.00 HYPERCHOLESTEROLEMIA: ICD-10-CM

## 2025-05-27 DIAGNOSIS — N18.31 STAGE 3A CHRONIC KIDNEY DISEASE (MULTI): ICD-10-CM

## 2025-05-27 DIAGNOSIS — E03.9 HYPOTHYROIDISM, UNSPECIFIED TYPE: ICD-10-CM

## 2025-05-27 DIAGNOSIS — L21.9 SEBORRHEIC DERMATITIS OF SCALP: ICD-10-CM

## 2025-05-27 PROBLEM — C50.919 BREAST CANCER: Status: RESOLVED | Noted: 2023-06-13 | Resolved: 2025-05-27

## 2025-05-27 PROCEDURE — 1160F RVW MEDS BY RX/DR IN RCRD: CPT | Performed by: INTERNAL MEDICINE

## 2025-05-27 PROCEDURE — 1036F TOBACCO NON-USER: CPT | Performed by: INTERNAL MEDICINE

## 2025-05-27 PROCEDURE — G2211 COMPLEX E/M VISIT ADD ON: HCPCS | Performed by: INTERNAL MEDICINE

## 2025-05-27 PROCEDURE — 1159F MED LIST DOCD IN RCRD: CPT | Performed by: INTERNAL MEDICINE

## 2025-05-27 PROCEDURE — 3078F DIAST BP <80 MM HG: CPT | Performed by: INTERNAL MEDICINE

## 2025-05-27 PROCEDURE — 99214 OFFICE O/P EST MOD 30 MIN: CPT | Performed by: INTERNAL MEDICINE

## 2025-05-27 PROCEDURE — 3075F SYST BP GE 130 - 139MM HG: CPT | Performed by: INTERNAL MEDICINE

## 2025-05-27 RX ORDER — PRAMIPEXOLE DIHYDROCHLORIDE 1.5 MG/1
TABLET ORAL
Qty: 45 TABLET | Refills: 3 | Status: SHIPPED | OUTPATIENT
Start: 2025-05-27

## 2025-05-27 ASSESSMENT — ENCOUNTER SYMPTOMS
FEVER: 0
DIARRHEA: 0
UNEXPECTED WEIGHT CHANGE: 1
PALPITATIONS: 0
ABDOMINAL PAIN: 0
SINUS PAIN: 0
ARTHRALGIAS: 0
BLOOD IN STOOL: 0
FATIGUE: 0
DIZZINESS: 0
DIFFICULTY URINATING: 0
WHEEZING: 0
SORE THROAT: 0
BRUISES/BLEEDS EASILY: 0
HYPERTENSION: 1
HEADACHES: 0
COUGH: 0
SHORTNESS OF BREATH: 1

## 2025-05-27 NOTE — PROGRESS NOTES
Subjective   Patient ID: Ashli Justice is a 74 y.o. female who presents for Hyperlipidemia, Hypertension, and Hypothyroidism.    --Recent blood work and the plan of care reviewed  - Morbid obesity unable to lose weight progressive weakness and lower back pain patient had surgical intervention before and pain management without improvement takes Tylenol only for pain patient was not able to Zepbound due to cost of medication  Continue scar reduction weight loss as recommended  - Hypertension not controlled needs a goal of blood pressure 120/80 continue hydralazine 25 mg 3 times daily  - Seborrheic scalp dermatitis slowly improving patient reassured to continue with current medication ciclopirox twice a week  - Chronic bilateral lower extremity lymphedema compensated.  -Restless leg syndrome continues iron sulfate every night  -History of lumbosacral surgery and chronic neuropathy.  Patient to continue gabapentin 300 mg at bedtime  topiramate 25 mg 1  tablets twice a day patient, compensated.  - Patient seen by oncology underlying [facility] follow-up closely stable normal results repeat blood work as a scheduled in 6 months  -History of breast cancer remission follow-up hematology oncology, no recurrence follow-up oncology  -- Bilateral carpal tunnel syndrome treated conservatively declined surgical intervention at this time  - Underlying sleep apnea continue with fatigue and tiredness and weight gain declined CPAP declined evaluation by sleep clinic  Morbid obesity continues weight loss as recommended  --Hypertension, improving, counseled about weight loss  -Hypothyroid controlled  -Lymphedema continue with continuous wrapping per lymphedema clinic as needed doing much better  -Lumbosacral disease cut down gabapentin take it only at bedtime  -Carotid atherosclerosis ultrasound not changed less than 50% no TIA continue on aspirin no recurrent symptoms  Follow-up 6 months      Hyperlipidemia  Associated symptoms include  shortness of breath. Pertinent negatives include no chest pain.   Hypertension  Associated symptoms include shortness of breath. Pertinent negatives include no chest pain, headaches or palpitations.          Review of Systems   Constitutional:  Positive for unexpected weight change. Negative for fatigue and fever.   HENT:  Negative for congestion, ear discharge, ear pain, mouth sores, sinus pain and sore throat.    Eyes:  Negative for visual disturbance.   Respiratory:  Positive for shortness of breath. Negative for cough and wheezing.    Cardiovascular:  Negative for chest pain, palpitations and leg swelling.   Gastrointestinal:  Negative for abdominal pain, blood in stool and diarrhea.   Genitourinary:  Negative for difficulty urinating.   Musculoskeletal:  Negative for arthralgias.   Skin:  Negative for rash.   Neurological:  Negative for dizziness and headaches.   Hematological:  Does not bruise/bleed easily.   Psychiatric/Behavioral:  Negative for behavioral problems.    All other systems reviewed and are negative.      Objective   Lab Results   Component Value Date    HGBA1C 5.9 (H) 02/11/2025      /62   Pulse 72   Temp 36.2 °C (97.1 °F)   Wt 104 kg (228 lb 9.6 oz)   SpO2 96%   BMI 41.81 kg/m²   Lab Results   Component Value Date    WBC 9.2 02/11/2025    HGB 12.6 02/11/2025    HCT 40.0 02/11/2025     02/11/2025    CHOL 132 02/11/2025    TRIG 242 (H) 02/11/2025    HDL 38 (L) 02/11/2025    ALT 21 02/11/2025    AST 19 02/11/2025     02/11/2025    K 4.3 02/11/2025     02/11/2025    CREATININE 1.01 (H) 02/11/2025    BUN 29 (H) 02/11/2025    CO2 24 02/11/2025    TSH 2.63 02/11/2025    INR 1.1 05/19/2021    HGBA1C 5.9 (H) 02/11/2025     par   Physical Exam  Vitals and nursing note reviewed.   Constitutional:       Appearance: Normal appearance. She is obese.   HENT:      Head: Normocephalic.      Nose: Nose normal.   Eyes:      Conjunctiva/sclera: Conjunctivae normal.      Pupils: Pupils  are equal, round, and reactive to light.   Cardiovascular:      Rate and Rhythm: Regular rhythm.   Pulmonary:      Effort: Pulmonary effort is normal.      Breath sounds: Normal breath sounds.   Abdominal:      General: Abdomen is flat.      Palpations: Abdomen is soft.   Musculoskeletal:      Cervical back: Neck supple.      Right lower leg: Edema present.      Left lower leg: Edema present.   Skin:     General: Skin is warm.   Neurological:      General: No focal deficit present.      Mental Status: She is oriented to person, place, and time.   Psychiatric:         Mood and Affect: Mood normal.         Assessment/Plan   Ashli was seen today for hyperlipidemia, hypertension and hypothyroidism.  Diagnoses and all orders for this visit:  Hypertension, unspecified type (Primary)  Stage 3a chronic kidney disease (Multi)  Vitamin D deficiency  Seborrheic dermatitis of scalp  Hypercholesterolemia  Hypothyroidism, unspecified type  Other orders  -     Follow Up In Primary Care - Established   --Recent blood work and the plan of care reviewed  - Morbid obesity unable to lose weight progressive weakness and lower back pain patient had surgical intervention before and pain management without improvement takes Tylenol only for pain patient was not able to Zepbound due to cost of medication  Continue scar reduction weight loss as recommended  - Hypertension not controlled needs a goal of blood pressure 120/80 continue hydralazine 25 mg 3 times daily  - Seborrheic scalp dermatitis slowly improving patient reassured to continue with current medication ciclopirox twice a week  - Chronic bilateral lower extremity lymphedema compensated.  -Restless leg syndrome continues iron sulfate every night  -History of lumbosacral surgery and chronic neuropathy.  Patient to continue gabapentin 300 mg at bedtime  topiramate 25 mg 1  tablets twice a day patient, compensated.  - Patient seen by oncology underlying [facility] follow-up closely stable  normal results repeat blood work as a scheduled in 6 months  -History of breast cancer remission follow-up hematology oncology, no recurrence follow-up oncology  -- Bilateral carpal tunnel syndrome treated conservatively declined surgical intervention at this time  - Underlying sleep apnea continue with fatigue and tiredness and weight gain declined CPAP declined evaluation by sleep clinic  Morbid obesity continues weight loss as recommended  --Hypertension, improving, counseled about weight loss  -Hypothyroid controlled  -Lymphedema continue with continuous wrapping per lymphedema clinic as needed doing much better  -Lumbosacral disease cut down gabapentin take it only at bedtime  -Carotid atherosclerosis ultrasound not changed less than 50% no TIA continue on aspirin no recurrent symptoms  Follow-up 6 months

## 2025-06-16 DIAGNOSIS — E78.00 HYPERCHOLESTEROLEMIA: ICD-10-CM

## 2025-06-16 RX ORDER — ROSUVASTATIN CALCIUM 10 MG/1
10 TABLET, COATED ORAL DAILY
Qty: 90 TABLET | Refills: 1 | Status: SHIPPED | OUTPATIENT
Start: 2025-06-16

## 2025-06-23 DIAGNOSIS — I10 BENIGN ESSENTIAL HYPERTENSION: ICD-10-CM

## 2025-06-23 RX ORDER — CARVEDILOL 25 MG/1
25 TABLET ORAL DAILY
Qty: 90 TABLET | Refills: 0 | Status: SHIPPED | OUTPATIENT
Start: 2025-06-23

## 2025-06-30 DIAGNOSIS — E03.9 HYPOTHYROIDISM, UNSPECIFIED TYPE: ICD-10-CM

## 2025-06-30 RX ORDER — LEVOTHYROXINE SODIUM 50 UG/1
50 TABLET ORAL DAILY
Qty: 90 TABLET | Refills: 1 | Status: SHIPPED | OUTPATIENT
Start: 2025-06-30

## 2025-07-13 DIAGNOSIS — G62.9 NEUROPATHY: ICD-10-CM

## 2025-07-14 RX ORDER — TOPIRAMATE 25 MG/1
25 TABLET, FILM COATED ORAL 2 TIMES DAILY
Qty: 180 TABLET | Refills: 0 | Status: SHIPPED | OUTPATIENT
Start: 2025-07-14

## 2025-07-27 DIAGNOSIS — M51.369 LUMBAR DEGENERATIVE DISC DISEASE: ICD-10-CM

## 2025-07-28 RX ORDER — GABAPENTIN 300 MG/1
300 CAPSULE ORAL DAILY
Qty: 90 CAPSULE | Refills: 0 | Status: SHIPPED | OUTPATIENT
Start: 2025-07-28

## 2025-11-25 ENCOUNTER — APPOINTMENT (OUTPATIENT)
Dept: PRIMARY CARE | Facility: CLINIC | Age: 75
End: 2025-11-25
Payer: MEDICARE